# Patient Record
Sex: FEMALE
[De-identification: names, ages, dates, MRNs, and addresses within clinical notes are randomized per-mention and may not be internally consistent; named-entity substitution may affect disease eponyms.]

---

## 2017-06-23 ENCOUNTER — HOSPITAL (OUTPATIENT)
Dept: OTHER | Age: 47
End: 2017-06-23
Attending: OBSTETRICS & GYNECOLOGY

## 2017-06-27 ENCOUNTER — HOSPITAL (OUTPATIENT)
Dept: OTHER | Age: 47
End: 2017-06-27
Attending: OBSTETRICS & GYNECOLOGY

## 2017-09-21 RX ORDER — LOSARTAN POTASSIUM AND HYDROCHLOROTHIAZIDE 25; 100 MG/1; MG/1
TABLET ORAL
Qty: 30 TABLET | Refills: 0 | Status: SHIPPED | OUTPATIENT
Start: 2017-09-21 | End: 2017-12-22

## 2017-09-25 ENCOUNTER — OFFICE VISIT (OUTPATIENT)
Dept: INTERNAL MEDICINE CLINIC | Facility: CLINIC | Age: 47
End: 2017-09-25

## 2017-09-25 VITALS
BODY MASS INDEX: 44.56 KG/M2 | DIASTOLIC BLOOD PRESSURE: 80 MMHG | OXYGEN SATURATION: 98 % | HEIGHT: 64.5 IN | HEART RATE: 89 BPM | SYSTOLIC BLOOD PRESSURE: 120 MMHG | RESPIRATION RATE: 16 BRPM | WEIGHT: 264.25 LBS | TEMPERATURE: 98 F

## 2017-09-25 DIAGNOSIS — E05.90 SUBCLINICAL HYPERTHYROIDISM: ICD-10-CM

## 2017-09-25 DIAGNOSIS — R00.2 PALPITATION: ICD-10-CM

## 2017-09-25 DIAGNOSIS — F41.9 ANXIETY: ICD-10-CM

## 2017-09-25 DIAGNOSIS — I10 ESSENTIAL HYPERTENSION: ICD-10-CM

## 2017-09-25 DIAGNOSIS — E66.01 MORBID OBESITY WITH BMI OF 40.0-44.9, ADULT (HCC): ICD-10-CM

## 2017-09-25 DIAGNOSIS — Z00.00 ANNUAL PHYSICAL EXAM: Primary | ICD-10-CM

## 2017-09-25 DIAGNOSIS — R06.00 DOE (DYSPNEA ON EXERTION): ICD-10-CM

## 2017-09-25 DIAGNOSIS — Z12.4 SCREENING FOR MALIGNANT NEOPLASM OF CERVIX: ICD-10-CM

## 2017-09-25 PROCEDURE — 88175 CYTOPATH C/V AUTO FLUID REDO: CPT | Performed by: PHYSICIAN ASSISTANT

## 2017-09-25 PROCEDURE — 90686 IIV4 VACC NO PRSV 0.5 ML IM: CPT | Performed by: PHYSICIAN ASSISTANT

## 2017-09-25 PROCEDURE — 99396 PREV VISIT EST AGE 40-64: CPT | Performed by: PHYSICIAN ASSISTANT

## 2017-09-25 PROCEDURE — 90471 IMMUNIZATION ADMIN: CPT | Performed by: PHYSICIAN ASSISTANT

## 2017-09-25 NOTE — PATIENT INSTRUCTIONS
Please do your lab work ASAP. Remember to fast for 10-12 hours but drink plenty of water.     Please call Hernan Gilliam at [phone] to set up the following tests:  - treadmill stress test    Follow up with a therapist for anxiety (

## 2017-09-25 NOTE — PROGRESS NOTES
Cathi Henriquez is a 40year old female who presents for a complete physical exam.   HPI:   Pt presents for annual wellness screening. C/o increased stress for the last several months. Attributes this to work, marriage, finances.   Feels anxiety is pea Erik Alvarez, [address]  2012: OTHER SURGICAL HISTORY      Comment: UTERINE ABLATION  2009: TUBAL LIGATION   Family History   Problem Relation Age of Onset   • Hypertension Mother    • Lipids Mother    • Cancer Neg    • Heart Disease Neg    • Stroke Neg       S CMT, adnexa nontender  BREAST: deferred to GYN  MUSCULOSKELETAL: no spinal tenderness, no CVA tenderness  EXTREMITIES: no cyanosis, clubbing, or edema  NEURO: A&O x3, cranial nerves are intact, gross sensation and motor function intact    ASSESSMENT AND PL

## 2017-10-06 RX ORDER — METRONIDAZOLE 7.5 MG/G
GEL VAGINAL
Qty: 1 TUBE | Refills: 0 | Status: SHIPPED | OUTPATIENT
Start: 2017-10-06 | End: 2018-02-13 | Stop reason: ALTCHOICE

## 2017-10-19 ENCOUNTER — TELEPHONE (OUTPATIENT)
Dept: INTERNAL MEDICINE CLINIC | Facility: CLINIC | Age: 47
End: 2017-10-19

## 2017-10-19 DIAGNOSIS — E66.01 MORBID OBESITY WITH BMI OF 40.0-44.9, ADULT (HCC): ICD-10-CM

## 2017-10-19 DIAGNOSIS — R00.2 PALPITATION: Primary | ICD-10-CM

## 2017-10-19 DIAGNOSIS — I10 ESSENTIAL HYPERTENSION: ICD-10-CM

## 2017-10-19 DIAGNOSIS — R06.00 DOE (DYSPNEA ON EXERTION): ICD-10-CM

## 2017-10-19 NOTE — TELEPHONE ENCOUNTER
Wrong stress test ordered, please send new order, patient scheduled for tomorrow morning?  Jesica scan or dobutamine, please advise

## 2017-12-15 NOTE — ED AVS SNAPSHOT
DISCHARGE PLANNING     Discharge to home or other facility with appropriate resources Progressing        DISCHARGE PLANNING - CARE MANAGEMENT     Discharge to post-acute care or home with appropriate resources Progressing        INFECTION - ADULT     Absence or prevention of progression during hospitalization Progressing     Absence of fever/infection during neutropenic period Progressing        PAIN - ADULT     Verbalizes/displays adequate comfort level or baseline comfort level Progressing        Potential for Falls     Patient will remain free of falls Progressing        SAFETY ADULT     Patient will remain free of falls Progressing     Maintain or return to baseline ADL function Progressing     Maintain or return mobility status to optimal level Progressing Sreedhar Gee   MRN: [id number]    Department:  [facility] Emergency Department   Date of Visit:  9/5/2018           Disclosure     Insurance plans vary and the physician(s) referred by the ER may not be covered by your plan.  Please contact you tell this physician (or your personal doctor if your instructions are to return to your personal doctor) about any new or lasting problems. The primary care or specialist physician will see patients referred from the [facility] Emergency Department.  Waldo Cifuentes

## 2017-12-22 RX ORDER — LOSARTAN POTASSIUM AND HYDROCHLOROTHIAZIDE 25; 100 MG/1; MG/1
TABLET ORAL
Qty: 90 TABLET | Refills: 1 | Status: SHIPPED | OUTPATIENT
Start: 2017-12-22 | End: 2018-08-20

## 2018-01-17 ENCOUNTER — APPOINTMENT (OUTPATIENT)
Dept: GENERAL RADIOLOGY | Age: 48
End: 2018-01-17
Attending: PHYSICIAN ASSISTANT
Payer: COMMERCIAL

## 2018-01-17 ENCOUNTER — HOSPITAL ENCOUNTER (OUTPATIENT)
Age: 48
Discharge: HOME | End: 2018-01-17
Payer: COMMERCIAL

## 2018-01-17 VITALS
RESPIRATION RATE: 16 BRPM | TEMPERATURE: 98 F | DIASTOLIC BLOOD PRESSURE: 92 MMHG | SYSTOLIC BLOOD PRESSURE: 144 MMHG | OXYGEN SATURATION: 98 % | HEART RATE: 83 BPM

## 2018-01-17 DIAGNOSIS — S46.911A STRAIN OF RIGHT ELBOW, INITIAL ENCOUNTER: ICD-10-CM

## 2018-01-17 DIAGNOSIS — S46.911A STRAIN OF RIGHT SHOULDER, INITIAL ENCOUNTER: Primary | ICD-10-CM

## 2018-01-17 PROCEDURE — 73030 X-RAY EXAM OF SHOULDER: CPT | Performed by: PHYSICIAN ASSISTANT

## 2018-01-17 PROCEDURE — 99214 OFFICE O/P EST MOD 30 MIN: CPT

## 2018-01-17 PROCEDURE — 73080 X-RAY EXAM OF ELBOW: CPT | Performed by: PHYSICIAN ASSISTANT

## 2018-01-17 PROCEDURE — 99213 OFFICE O/P EST LOW 20 MIN: CPT

## 2018-01-17 RX ORDER — IBUPROFEN 600 MG/1
600 TABLET ORAL EVERY 6 HOURS
Qty: 20 TABLET | Refills: 0 | Status: SHIPPED | OUTPATIENT
Start: 2018-01-17 | End: 2018-01-22

## 2018-01-17 RX ORDER — IBUPROFEN 600 MG/1
600 TABLET ORAL ONCE
Status: COMPLETED | OUTPATIENT
Start: 2018-01-17 | End: 2018-01-17

## 2018-01-17 NOTE — ED PROVIDER NOTES
Patient Seen in: [address] [facility] In [facility]    History   Patient presents with:  Shoulder Injury    Stated Complaint: right shoulder pain 2 days    HPI    Venice Late is a 78-year-old female who presents today for evaluation of right upper extremit air)    Current:/92   Pulse 83   Temp 98 °F (36.7 °C) (Oral)   Resp 16   LMP 01/16/2018   SpO2 98%         Physical Exam   Constitutional: She is oriented to person, place, and time. She appears well-developed and well-nourished. No distress.    HENT: humeral diaphysis. Minimal spurring involves the lateral compartment of the right elbow with a tiny lateral epicondylar spur. SOFT TISSUES:  Negative. No visible soft tissue swelling. EFFUSION:  None visible. OTHER:  Negative.       CONCLUSION:  No acute patient is in good condition throughout the visit today and remains so upon discharge. I discuss the plan of care with the patient, who expresses understanding.   All questions and concerns are addressed to the patient's satisfaction prior to discharge tod

## 2018-02-13 ENCOUNTER — OFFICE VISIT (OUTPATIENT)
Dept: INTERNAL MEDICINE CLINIC | Facility: CLINIC | Age: 48
End: 2018-02-13

## 2018-02-13 ENCOUNTER — APPOINTMENT (OUTPATIENT)
Dept: LAB | Age: 48
End: 2018-02-13
Attending: NURSE PRACTITIONER
Payer: COMMERCIAL

## 2018-02-13 VITALS
RESPIRATION RATE: 16 BRPM | TEMPERATURE: 99 F | HEART RATE: 86 BPM | SYSTOLIC BLOOD PRESSURE: 124 MMHG | DIASTOLIC BLOOD PRESSURE: 80 MMHG | WEIGHT: 262.25 LBS | BODY MASS INDEX: 44 KG/M2 | OXYGEN SATURATION: 99 %

## 2018-02-13 DIAGNOSIS — I10 ESSENTIAL HYPERTENSION: ICD-10-CM

## 2018-02-13 DIAGNOSIS — R25.2 LEG CRAMPING: Primary | ICD-10-CM

## 2018-02-13 DIAGNOSIS — R06.00 DYSPNEA ON EXERTION: ICD-10-CM

## 2018-02-13 DIAGNOSIS — M25.511 ACUTE PAIN OF RIGHT SHOULDER: ICD-10-CM

## 2018-02-13 DIAGNOSIS — F41.9 ANXIETY: ICD-10-CM

## 2018-02-13 LAB
BUN BLD-MCNC: 9 MG/DL (ref 8–20)
CALCIUM BLD-MCNC: 9.4 MG/DL (ref 8.3–10.3)
CHLORIDE: 107 MMOL/L (ref 101–111)
CO2: 29 MMOL/L (ref 22–32)
CREAT BLD-MCNC: 0.85 MG/DL (ref 0.55–1.02)
GLUCOSE BLD-MCNC: 78 MG/DL (ref 70–99)
HAV IGM SER QL: 1.9 MG/DL (ref 1.7–3)
POTASSIUM SERPL-SCNC: 4 MMOL/L (ref 3.6–5.1)
SODIUM SERPL-SCNC: 141 MMOL/L (ref 136–144)

## 2018-02-13 PROCEDURE — 99213 OFFICE O/P EST LOW 20 MIN: CPT | Performed by: NURSE PRACTITIONER

## 2018-02-13 PROCEDURE — 80048 BASIC METABOLIC PNL TOTAL CA: CPT | Performed by: NURSE PRACTITIONER

## 2018-02-13 PROCEDURE — 83735 ASSAY OF MAGNESIUM: CPT | Performed by: NURSE PRACTITIONER

## 2018-02-13 RX ORDER — POTASSIUM CHLORIDE 750 MG/1
10 TABLET, EXTENDED RELEASE ORAL DAILY
Qty: 30 TABLET | Refills: 5 | Status: SHIPPED | OUTPATIENT
Start: 2018-02-13 | End: 2019-03-05

## 2018-02-13 NOTE — PROGRESS NOTES
CHIEF COMPLAINT:     Patient presents with:  Urgent Care F/u: 1/17/18 dt right shoulder injury. Still c/o right shoulder pain. Leg Pain: Intermittent right lower leg pain since last week. Anxiety: and stress since last month.        HPI:   Kristen Goins She does want to get her stress test done that was ordered as she is also still having KWOK and fatigue which she feels may be related to deconditioning and anxiety but wants to be sure her heart is ok.        Current Outpatient Prescriptions:  Potassium Chl to spine or notable deformities, FROM of neck  Exam of right shoulder/elbow   - swelling: none  - tenderness: to scapula and acromion head/humerus   - deformity/defect: none obvious  - crepitus: none  - ecchymosis/bruising: none  - tendon / deep structures

## 2018-02-27 ENCOUNTER — TELEPHONE (OUTPATIENT)
Dept: INTERNAL MEDICINE CLINIC | Facility: CLINIC | Age: 48
End: 2018-02-27

## 2018-02-27 ENCOUNTER — HOSPITAL ENCOUNTER (OUTPATIENT)
Dept: CV DIAGNOSTICS | Facility: HOSPITAL | Age: 48
Discharge: HOME | End: 2018-02-27
Attending: PHYSICIAN ASSISTANT
Payer: COMMERCIAL

## 2018-02-27 DIAGNOSIS — I10 ESSENTIAL HYPERTENSION: ICD-10-CM

## 2018-02-27 DIAGNOSIS — R00.2 PALPITATION: ICD-10-CM

## 2018-02-27 DIAGNOSIS — E66.01 MORBID OBESITY WITH BMI OF 40.0-44.9, ADULT (HCC): ICD-10-CM

## 2018-02-27 DIAGNOSIS — R06.00 DOE (DYSPNEA ON EXERTION): ICD-10-CM

## 2018-02-27 PROCEDURE — 93018 CV STRESS TEST I&R ONLY: CPT | Performed by: PHYSICIAN ASSISTANT

## 2018-02-27 PROCEDURE — 93017 CV STRESS TEST TRACING ONLY: CPT | Performed by: PHYSICIAN ASSISTANT

## 2018-02-27 NOTE — TELEPHONE ENCOUNTER
Patient called regarding test results. Stated that it has been a few weeks since she had her labs done and has not received a call for results.  Please call

## 2018-03-05 ENCOUNTER — APPOINTMENT (OUTPATIENT)
Dept: PHYSICAL THERAPY | Age: 48
End: 2018-03-05
Attending: NURSE PRACTITIONER
Payer: COMMERCIAL

## 2018-03-07 ENCOUNTER — OFFICE VISIT (OUTPATIENT)
Dept: PHYSICAL THERAPY | Age: 48
End: 2018-03-07
Attending: NURSE PRACTITIONER
Payer: COMMERCIAL

## 2018-03-07 DIAGNOSIS — M25.511 ACUTE PAIN OF RIGHT SHOULDER: ICD-10-CM

## 2018-03-07 PROCEDURE — 97110 THERAPEUTIC EXERCISES: CPT

## 2018-03-07 PROCEDURE — 97161 PT EVAL LOW COMPLEX 20 MIN: CPT

## 2018-03-08 NOTE — PROGRESS NOTES
INITIAL EVALUATION:   Referring Physician: Dr. Bright Owens  Diagnosis: Acute pain of right shoulder (M25.511)       Date of Service: 3/7/2018     PATIENT Belinda Gallegos is a 56year old y/o female who presents to therapy today with comp musculature  Sensation: Bilateral UE light touch sensation intact    ROM: Seated AROM Right shoulder; Shoulder flexion 40 degrees, Abduction 50 degrees;  Supine AROM shoulder flexion 130 degrees, ER 90 degrees, IR 70 degrees    Accessory motion: No signific participate in planning and for this course of care. Thank you for your referral. Please co-sign or sign and return this letter via fax as soon as possible to [phone].  If you have any questions, please contact me at Dept: [phone]    Sincerely,

## 2018-03-14 ENCOUNTER — OFFICE VISIT (OUTPATIENT)
Dept: PHYSICAL THERAPY | Age: 48
End: 2018-03-14
Attending: NURSE PRACTITIONER
Payer: COMMERCIAL

## 2018-03-14 DIAGNOSIS — M25.511 ACUTE PAIN OF RIGHT SHOULDER: ICD-10-CM

## 2018-03-14 PROCEDURE — 97110 THERAPEUTIC EXERCISES: CPT

## 2018-03-15 NOTE — PROGRESS NOTES
Dx: Acute pain of right shoulder (M25.511)     Authorized # of Visits:  12  Fall Risk: standard         Precautions: n/a             Subjective: The patient continues to have significant limitations with ROM.  She reports she hasn't gotten shoulder pulley

## 2018-03-19 ENCOUNTER — OFFICE VISIT (OUTPATIENT)
Dept: PHYSICAL THERAPY | Age: 48
End: 2018-03-19
Attending: NURSE PRACTITIONER
Payer: COMMERCIAL

## 2018-03-19 DIAGNOSIS — M25.511 ACUTE PAIN OF RIGHT SHOULDER: ICD-10-CM

## 2018-03-19 PROCEDURE — 97110 THERAPEUTIC EXERCISES: CPT

## 2018-03-20 NOTE — PROGRESS NOTES
Dx: Acute pain of right shoulder (M25.511)     Authorized # of Visits:  12  Fall Risk: standard         Precautions: n/a             Subjective:    The patient arrived 20 minutes late  Objective:   See flow sheet  ROM: Shoulder flexion 45 degrees with signi

## 2018-03-21 ENCOUNTER — OFFICE VISIT (OUTPATIENT)
Dept: PHYSICAL THERAPY | Age: 48
End: 2018-03-21
Attending: NURSE PRACTITIONER
Payer: COMMERCIAL

## 2018-03-21 DIAGNOSIS — M25.511 ACUTE PAIN OF RIGHT SHOULDER: ICD-10-CM

## 2018-03-21 PROCEDURE — 97110 THERAPEUTIC EXERCISES: CPT

## 2018-03-22 NOTE — PROGRESS NOTES
Dx: Acute pain of right shoulder (M25.511)     Authorized # of Visits:  12  Fall Risk: standard         Precautions: n/a             Subjective:    The patient arrived 20 minutes late again  Objective:   See flow sheet  ROM: Shoulder flexion 45 degrees with

## 2018-03-26 ENCOUNTER — APPOINTMENT (OUTPATIENT)
Dept: PHYSICAL THERAPY | Age: 48
End: 2018-03-26
Attending: NURSE PRACTITIONER
Payer: COMMERCIAL

## 2018-03-28 ENCOUNTER — APPOINTMENT (OUTPATIENT)
Dept: PHYSICAL THERAPY | Age: 48
End: 2018-03-28
Attending: NURSE PRACTITIONER
Payer: COMMERCIAL

## 2018-04-02 ENCOUNTER — APPOINTMENT (OUTPATIENT)
Dept: PHYSICAL THERAPY | Age: 48
End: 2018-04-02
Attending: NURSE PRACTITIONER
Payer: COMMERCIAL

## 2018-04-04 ENCOUNTER — APPOINTMENT (OUTPATIENT)
Dept: PHYSICAL THERAPY | Age: 48
End: 2018-04-04
Attending: NURSE PRACTITIONER
Payer: COMMERCIAL

## 2018-04-11 ENCOUNTER — APPOINTMENT (OUTPATIENT)
Dept: PHYSICAL THERAPY | Age: 48
End: 2018-04-11
Attending: NURSE PRACTITIONER
Payer: COMMERCIAL

## 2018-08-03 ENCOUNTER — OFFICE VISIT (OUTPATIENT)
Dept: INTERNAL MEDICINE CLINIC | Facility: CLINIC | Age: 48
End: 2018-08-03
Payer: COMMERCIAL

## 2018-08-03 ENCOUNTER — APPOINTMENT (OUTPATIENT)
Dept: LAB | Age: 48
End: 2018-08-03
Attending: PHYSICIAN ASSISTANT
Payer: COMMERCIAL

## 2018-08-03 VITALS
BODY MASS INDEX: 40.41 KG/M2 | OXYGEN SATURATION: 97 % | SYSTOLIC BLOOD PRESSURE: 124 MMHG | HEIGHT: 65.5 IN | TEMPERATURE: 99 F | WEIGHT: 245.5 LBS | DIASTOLIC BLOOD PRESSURE: 76 MMHG | HEART RATE: 97 BPM

## 2018-08-03 DIAGNOSIS — Z00.00 ANNUAL PHYSICAL EXAM: Primary | ICD-10-CM

## 2018-08-03 DIAGNOSIS — Z12.39 SCREENING FOR MALIGNANT NEOPLASM OF BREAST: ICD-10-CM

## 2018-08-03 LAB
ALT SERPL-CCNC: 22 U/L (ref 14–54)
ANION GAP SERPL CALC-SCNC: 6 MMOL/L (ref 0–18)
AST SERPL-CCNC: 15 U/L (ref 15–41)
BUN BLD-MCNC: 10 MG/DL (ref 8–20)
BUN/CREAT SERPL: 12.5 (ref 10–20)
CALCIUM BLD-MCNC: 8.7 MG/DL (ref 8.3–10.3)
CHLORIDE SERPL-SCNC: 106 MMOL/L (ref 101–111)
CHOLEST SMN-MCNC: 185 MG/DL (ref ?–200)
CO2 SERPL-SCNC: 29 MMOL/L (ref 22–32)
CREAT BLD-MCNC: 0.8 MG/DL (ref 0.55–1.02)
EST. AVERAGE GLUCOSE BLD GHB EST-MCNC: 117 MG/DL (ref 68–126)
GLUCOSE BLD-MCNC: 73 MG/DL (ref 70–99)
HBA1C MFR BLD HPLC: 5.7 % (ref ?–5.7)
HDLC SERPL-MCNC: 67 MG/DL (ref 40–59)
LDLC SERPL CALC-MCNC: 106 MG/DL (ref ?–100)
NONHDLC SERPL-MCNC: 118 MG/DL (ref ?–130)
OSMOLALITY SERPL CALC.SUM OF ELEC: 290 MOSM/KG (ref 275–295)
POTASSIUM SERPL-SCNC: 3.8 MMOL/L (ref 3.6–5.1)
SODIUM SERPL-SCNC: 141 MMOL/L (ref 136–144)
T4 FREE SERPL-MCNC: 1 NG/DL (ref 0.9–1.8)
TRIGL SERPL-MCNC: 58 MG/DL (ref 30–149)
TSI SER-ACNC: 0.12 MIU/ML (ref 0.35–5.5)
VLDLC SERPL CALC-MCNC: 12 MG/DL (ref 0–30)

## 2018-08-03 PROCEDURE — 84443 ASSAY THYROID STIM HORMONE: CPT | Performed by: PHYSICIAN ASSISTANT

## 2018-08-03 PROCEDURE — 84439 ASSAY OF FREE THYROXINE: CPT | Performed by: PHYSICIAN ASSISTANT

## 2018-08-03 PROCEDURE — 36415 COLL VENOUS BLD VENIPUNCTURE: CPT | Performed by: PHYSICIAN ASSISTANT

## 2018-08-03 PROCEDURE — 83036 HEMOGLOBIN GLYCOSYLATED A1C: CPT | Performed by: PHYSICIAN ASSISTANT

## 2018-08-03 PROCEDURE — 99386 PREV VISIT NEW AGE 40-64: CPT | Performed by: PHYSICIAN ASSISTANT

## 2018-08-03 PROCEDURE — 84460 ALANINE AMINO (ALT) (SGPT): CPT | Performed by: PHYSICIAN ASSISTANT

## 2018-08-03 PROCEDURE — 80061 LIPID PANEL: CPT | Performed by: PHYSICIAN ASSISTANT

## 2018-08-03 PROCEDURE — 80048 BASIC METABOLIC PNL TOTAL CA: CPT | Performed by: PHYSICIAN ASSISTANT

## 2018-08-03 PROCEDURE — 84450 TRANSFERASE (AST) (SGOT): CPT | Performed by: PHYSICIAN ASSISTANT

## 2018-08-03 NOTE — PROGRESS NOTES
Deyanira Gardner is a 56year old female who presents for a complete physical exam.   HPI:   Pt presents for annual wellness screening. Diet - well balanced, trying to focus on a low carb diet. Drinks 1 cup of coffee and water.   Exercise - none current weakness  BREAST: no pain, discharge, or lumps  HEMATOLOGIC: denies easy bruising or bleeding  LYMPH: denies swollen lymph nodes  ENDOCRINE: denies hot/cold intolerance    EXAM:   /76 (BP Location: Left arm, Patient Position: Sitting, Cuff Size: larg Discussed MMG screening recs, pt prefers to screen yearly. -- she does these at [facility].  # Cervical CA screening: normal pap 9/2017 - repeat in 2020. # Colon CA screening: c-scope at age 48. # Bone density: rec ca, vit d, regular WB exercise.

## 2018-08-03 NOTE — PATIENT INSTRUCTIONS
Blood work today. Schedule your mammogram.    See Dr. Danisha Alvares. Flu shot in October. If labs normal, please see Bere Martinez in 6-12 months.

## 2018-08-20 RX ORDER — LOSARTAN POTASSIUM AND HYDROCHLOROTHIAZIDE 25; 100 MG/1; MG/1
1 TABLET ORAL DAILY
Qty: 90 TABLET | Refills: 0 | Status: SHIPPED | OUTPATIENT
Start: 2018-08-20 | End: 2018-12-17

## 2018-08-20 NOTE — TELEPHONE ENCOUNTER
Refill requested: Losartan HCTZ 100-25 mg     Passed protocol      Last refill: 12/22/17 #90 1R    Relevant Labs: BMP 8/3/18 LL   BP Readings from Last 3 Encounters:  08/03/18 : 124/76  02/13/18 : 124/80  01/17/18 : 144/92      Last OV / RTC advised: 8/3/1

## 2018-09-06 NOTE — ED PROVIDER NOTES
Patient Seen in: [facility] Emergency Department    History   Patient presents with: Anxiety/Panic attack (neurologic)    Stated Complaint: anxiety/panic attack denies SI    HPI    Patient is a 52-year-old woman here with anxiety.   For the past cou person, place, and time. She appears well-developed and well-nourished. Non-toxic appearance. No distress. HENT:   Head: Normocephalic and atraumatic. Eyes: Conjunctivae are normal. No scleral icterus. Neck: Normal range of motion. Neck supple.    Ca

## 2018-09-06 NOTE — BH LEVEL OF CARE ASSESSMENT
Level of Care Assessment Note    General Questions  Why are you here?: Pt is a 56 yr old female who arrived to the ER alone due to recent anxiety. \"I've been having a lot of anxiety. Unable to concentrate, lack of sleep, not happy. \" Pt states this has be hallucinations drug or alcohol use or abuse. She has not had a good night sleep in a very long time and came as thoughts of her problems keep her awake and she has trouble sleeping. \"    Family Collateral  Family Collateral: none available  Reason Patient or observed  Depression Symptoms: Appetite change;Sleep disturbance; Increased irritability;Crying; Change in energy level  Depression Description:  had an affair and she's having a hard time dealing with it, states has been  half her life and Yes                          Current/Previous MH/CD Treatment  Recovery Support Groups: Denies Past History  History of Seclusion/Restraint: No    Alcohol Use  How often do you have a drink containing alcohol? : 1 time per month or less  Alcohol Use  Age a Not appropriate for reporting to authorities    General Appearance  Characteristics: Appropriate clothing (pt wearing a hospital gown)  Eye Contact: Direct  Psychomotor Behavior  Gait/Movement: Other (comment) (yris; pt laying on hosptial bed)  Abnormal mov counseling \"years ago. \" Pt has no current providers. Pt's friend gave her a referral for a counselor and pt states she plans on calling her PCP for medication to help with her anxiety.     Risk/Protective Factors  Risk Factors: Stressful life events or lo

## 2018-09-06 NOTE — ED INITIAL ASSESSMENT (HPI)
Here for anxiety for months, worsening sx with insomnia denies any SI.  Punched wall tonight, complained of bilateral wrist pain

## 2018-10-04 PROBLEM — E04.1 THYROID NODULE: Status: ACTIVE | Noted: 2018-10-04

## 2018-11-19 ENCOUNTER — TELEPHONE (OUTPATIENT)
Dept: INTERNAL MEDICINE CLINIC | Facility: CLINIC | Age: 48
End: 2018-11-19

## 2018-11-19 NOTE — TELEPHONE ENCOUNTER
Menses started 10/29/18 ,pretty much steady flow Will stop a day at a time then resume . Traveling to [facility] tomorrow /Feeling very tired

## 2018-12-04 ENCOUNTER — IMMUNIZATION (OUTPATIENT)
Dept: INTERNAL MEDICINE CLINIC | Facility: CLINIC | Age: 48
End: 2018-12-04
Payer: COMMERCIAL

## 2018-12-04 DIAGNOSIS — Z23 NEED FOR VACCINATION: ICD-10-CM

## 2018-12-04 PROCEDURE — 90471 IMMUNIZATION ADMIN: CPT | Performed by: INTERNAL MEDICINE

## 2018-12-04 PROCEDURE — 90686 IIV4 VACC NO PRSV 0.5 ML IM: CPT | Performed by: INTERNAL MEDICINE

## 2018-12-17 RX ORDER — LOSARTAN POTASSIUM AND HYDROCHLOROTHIAZIDE 25; 100 MG/1; MG/1
1 TABLET ORAL DAILY
Qty: 90 TABLET | Refills: 0 | Status: SHIPPED | OUTPATIENT
Start: 2018-12-17 | End: 2019-03-05

## 2018-12-17 NOTE — TELEPHONE ENCOUNTER
Last office visit 8/3/18 with MATILDE Cormier. Last labs 8/3/18. Last refill 8/20/18 (#90, 0 refills).

## 2019-03-05 ENCOUNTER — LAB ENCOUNTER (OUTPATIENT)
Dept: LAB | Age: 49
End: 2019-03-05
Attending: PHYSICIAN ASSISTANT
Payer: COMMERCIAL

## 2019-03-05 ENCOUNTER — OFFICE VISIT (OUTPATIENT)
Dept: INTERNAL MEDICINE CLINIC | Facility: CLINIC | Age: 49
End: 2019-03-05
Payer: COMMERCIAL

## 2019-03-05 VITALS
TEMPERATURE: 98 F | OXYGEN SATURATION: 98 % | HEART RATE: 97 BPM | RESPIRATION RATE: 16 BRPM | SYSTOLIC BLOOD PRESSURE: 130 MMHG | HEIGHT: 65 IN | BODY MASS INDEX: 41.65 KG/M2 | WEIGHT: 250 LBS | DIASTOLIC BLOOD PRESSURE: 76 MMHG

## 2019-03-05 DIAGNOSIS — R73.03 PREDIABETES: ICD-10-CM

## 2019-03-05 DIAGNOSIS — E66.01 MORBID OBESITY WITH BMI OF 40.0-44.9, ADULT (HCC): ICD-10-CM

## 2019-03-05 DIAGNOSIS — R00.2 PALPITATIONS: ICD-10-CM

## 2019-03-05 DIAGNOSIS — E04.1 THYROID NODULE: ICD-10-CM

## 2019-03-05 DIAGNOSIS — Z02.89 ENCOUNTER FOR PHYSICAL EXAMINATION RELATED TO EMPLOYMENT: ICD-10-CM

## 2019-03-05 DIAGNOSIS — I10 ESSENTIAL HYPERTENSION: ICD-10-CM

## 2019-03-05 DIAGNOSIS — Z02.89 ENCOUNTER FOR PHYSICAL EXAMINATION RELATED TO EMPLOYMENT: Primary | ICD-10-CM

## 2019-03-05 DIAGNOSIS — R00.2 PALPITATION: ICD-10-CM

## 2019-03-05 DIAGNOSIS — E05.90 SUBCLINICAL HYPERTHYROIDISM: ICD-10-CM

## 2019-03-05 DIAGNOSIS — F41.9 ANXIETY: ICD-10-CM

## 2019-03-05 DIAGNOSIS — R25.2 LEG CRAMPING: ICD-10-CM

## 2019-03-05 LAB
ANION GAP SERPL CALC-SCNC: 8 MMOL/L (ref 0–18)
BUN BLD-MCNC: 11 MG/DL (ref 7–18)
BUN/CREAT SERPL: 12.4 (ref 10–20)
CALCIUM BLD-MCNC: 9.5 MG/DL (ref 8.5–10.1)
CHLORIDE SERPL-SCNC: 105 MMOL/L (ref 98–107)
CO2 SERPL-SCNC: 27 MMOL/L (ref 21–32)
CREAT BLD-MCNC: 0.89 MG/DL (ref 0.55–1.02)
EST. AVERAGE GLUCOSE BLD GHB EST-MCNC: 111 MG/DL (ref 68–126)
GLUCOSE BLD-MCNC: 83 MG/DL (ref 70–99)
HBA1C MFR BLD HPLC: 5.5 % (ref ?–5.7)
OSMOLALITY SERPL CALC.SUM OF ELEC: 289 MOSM/KG (ref 275–295)
POTASSIUM SERPL-SCNC: 3.9 MMOL/L (ref 3.5–5.1)
SODIUM SERPL-SCNC: 140 MMOL/L (ref 136–145)

## 2019-03-05 PROCEDURE — 83036 HEMOGLOBIN GLYCOSYLATED A1C: CPT | Performed by: PHYSICIAN ASSISTANT

## 2019-03-05 PROCEDURE — 36415 COLL VENOUS BLD VENIPUNCTURE: CPT | Performed by: PHYSICIAN ASSISTANT

## 2019-03-05 PROCEDURE — 99214 OFFICE O/P EST MOD 30 MIN: CPT | Performed by: PHYSICIAN ASSISTANT

## 2019-03-05 PROCEDURE — 80048 BASIC METABOLIC PNL TOTAL CA: CPT | Performed by: PHYSICIAN ASSISTANT

## 2019-03-05 RX ORDER — METOPROLOL SUCCINATE 25 MG/1
TABLET, EXTENDED RELEASE ORAL
Qty: 90 TABLET | Refills: 1 | Status: SHIPPED | OUTPATIENT
Start: 2019-03-05 | End: 2019-10-30

## 2019-03-05 RX ORDER — POTASSIUM CHLORIDE 750 MG/1
10 TABLET, EXTENDED RELEASE ORAL DAILY
Qty: 90 TABLET | Refills: 1 | Status: SHIPPED | OUTPATIENT
Start: 2019-03-05 | End: 2019-10-30

## 2019-03-05 RX ORDER — LOSARTAN POTASSIUM AND HYDROCHLOROTHIAZIDE 25; 100 MG/1; MG/1
1 TABLET ORAL DAILY
Qty: 90 TABLET | Refills: 1 | Status: SHIPPED | OUTPATIENT
Start: 2019-03-05 | End: 2019-10-21

## 2019-03-05 NOTE — PATIENT INSTRUCTIONS
Take BP medication AND potassium supplement every day (try taking potassium supplement with applesauce or jello). Resume metoprolol 25 mg daily for palpitations. Resume seeing therapist for anxiety.     Follow up with Rick Grover in 1 month if anxiety not

## 2019-03-05 NOTE — PROGRESS NOTES
Deyanira Gardner is a 53year old female. HPI:   Patient presents for f/u of chronic health issues and work form. Will be working as a . HTN - compliant with medication, no SEs.   Takes K supp only 1-2 times a week bc she has a fred headaches, dizziness, LH  EXT: denies edema    EXAM:   /76 (BP Location: Left arm, Patient Position: Sitting, Cuff Size: large)   Pulse 97   Temp 98.3 °F (36.8 °C) (Oral)   Resp 16   Ht 65\"   Wt 250 lb   LMP 11/01/2018 (Approximate)   SpO2 98%   Chelsea deficiency: now on OTC vit d supp. # Abnormal hgb: pt has known sickle cell trait.     Health Maint:  # Breast CA screening: normal MMG 4/2017 - normal per patient.  Discussed MMG screening recs, pt prefers to screen yearly.  -- she does these at [facility]

## 2019-03-09 ENCOUNTER — HOSPITAL ENCOUNTER (OUTPATIENT)
Dept: MAMMOGRAPHY | Age: 49
Discharge: HOME | End: 2019-03-09
Attending: PHYSICIAN ASSISTANT
Payer: COMMERCIAL

## 2019-03-09 DIAGNOSIS — Z00.00 ANNUAL PHYSICAL EXAM: ICD-10-CM

## 2019-03-09 DIAGNOSIS — Z12.39 SCREENING FOR MALIGNANT NEOPLASM OF BREAST: ICD-10-CM

## 2019-03-09 PROCEDURE — 77067 SCR MAMMO BI INCL CAD: CPT | Performed by: PHYSICIAN ASSISTANT

## 2019-03-09 PROCEDURE — 77063 BREAST TOMOSYNTHESIS BI: CPT | Performed by: PHYSICIAN ASSISTANT

## 2019-10-21 DIAGNOSIS — I10 ESSENTIAL HYPERTENSION: Primary | ICD-10-CM

## 2019-10-22 NOTE — TELEPHONE ENCOUNTER
Patient made appt for 10/30/19, has no medication left and wants to know if she can have a refill until her appt sent to Ted [facility]son

## 2019-10-23 RX ORDER — LOSARTAN POTASSIUM AND HYDROCHLOROTHIAZIDE 25; 100 MG/1; MG/1
1 TABLET ORAL DAILY
Qty: 90 TABLET | Refills: 1 | Status: SHIPPED | OUTPATIENT
Start: 2019-10-23 | End: 2020-03-13

## 2019-10-23 NOTE — TELEPHONE ENCOUNTER
Last OV: 3/5/19 with María Arango PA-C  Last refill date: 3/5/19     #/refills: #90, 1 refill  When pt was asked to return for OV: August 2019  Upcoming appt: 10/30/19 with Dr. Caro Bone   Last labs 3/5/19

## 2019-10-30 ENCOUNTER — OFFICE VISIT (OUTPATIENT)
Dept: INTERNAL MEDICINE CLINIC | Facility: CLINIC | Age: 49
End: 2019-10-30
Payer: COMMERCIAL

## 2019-10-30 VITALS
RESPIRATION RATE: 16 BRPM | TEMPERATURE: 98 F | BODY MASS INDEX: 43.32 KG/M2 | SYSTOLIC BLOOD PRESSURE: 110 MMHG | HEIGHT: 65 IN | HEART RATE: 84 BPM | WEIGHT: 260 LBS | DIASTOLIC BLOOD PRESSURE: 72 MMHG

## 2019-10-30 DIAGNOSIS — Z00.00 ENCOUNTER FOR PREVENTATIVE ADULT HEALTH CARE EXAMINATION: Primary | ICD-10-CM

## 2019-10-30 DIAGNOSIS — E55.9 VITAMIN D DEFICIENCY: ICD-10-CM

## 2019-10-30 DIAGNOSIS — R73.03 PREDIABETES: ICD-10-CM

## 2019-10-30 DIAGNOSIS — E05.90 SUBCLINICAL HYPERTHYROIDISM: ICD-10-CM

## 2019-10-30 DIAGNOSIS — E87.6 HYPOKALEMIA: ICD-10-CM

## 2019-10-30 DIAGNOSIS — R00.2 PALPITATION: ICD-10-CM

## 2019-10-30 DIAGNOSIS — I10 ESSENTIAL HYPERTENSION: ICD-10-CM

## 2019-10-30 PROCEDURE — 99396 PREV VISIT EST AGE 40-64: CPT | Performed by: INTERNAL MEDICINE

## 2019-10-30 RX ORDER — METOPROLOL SUCCINATE 25 MG/1
TABLET, EXTENDED RELEASE ORAL
Qty: 90 TABLET | Refills: 1 | Status: SHIPPED | OUTPATIENT
Start: 2019-10-30 | End: 2020-12-15

## 2019-10-30 RX ORDER — POTASSIUM CHLORIDE 750 MG/1
10 TABLET, EXTENDED RELEASE ORAL DAILY
Qty: 90 TABLET | Refills: 1 | Status: SHIPPED | OUTPATIENT
Start: 2019-10-30 | End: 2020-12-15

## 2019-10-30 NOTE — PATIENT INSTRUCTIONS
- Get blood work done at [address] [place] [address] when you are fasting (at least 8 hours, water and medications only).   - We will call you with the results  - Colonoscopy recommended at age 49  - Try to exercise at least 5-6 hours a week    It was a pleasure seeing you in th

## 2019-10-30 NOTE — PROGRESS NOTES
Yuni Horne is a 53year old female. HPI:   Patient presents with:  Physical    Patient presents for CPX/wellness examination. Diet: Not the best.   Exercise: She used to exercise, but not as much of late. Vision: Wears glasses/contacts.   Up to surgical history (2012); other surgical history (2005); and other surgical history (2012). Family: family history includes Hypertension in her mother; Lipids in her mother. Social:  reports that she has never smoked.  She has never used smokeless tobacco. ER 10 MEQ Oral Tab CR; Take 1 tablet (10 mEq total) by mouth daily. Dispense: 90 tablet; Refill: 1  - COMP METABOLIC PANEL (14); Future    3. Vitamin D deficiency  Not currently on supplementation. Will check updated Vitamin D levels.   - VITAMIN D, 25-HY

## 2020-03-11 DIAGNOSIS — I10 ESSENTIAL HYPERTENSION: ICD-10-CM

## 2020-03-13 RX ORDER — LOSARTAN POTASSIUM AND HYDROCHLOROTHIAZIDE 25; 100 MG/1; MG/1
1 TABLET ORAL DAILY
Qty: 90 TABLET | Refills: 1 | Status: SHIPPED | OUTPATIENT
Start: 2020-03-13 | End: 2020-08-22

## 2020-08-07 ENCOUNTER — APPOINTMENT (OUTPATIENT)
Dept: GENERAL RADIOLOGY | Age: 50
End: 2020-08-07
Payer: COMMERCIAL

## 2020-08-07 ENCOUNTER — HOSPITAL ENCOUNTER (OUTPATIENT)
Age: 50
Discharge: HOME | End: 2020-08-07
Payer: COMMERCIAL

## 2020-08-07 VITALS
DIASTOLIC BLOOD PRESSURE: 81 MMHG | HEART RATE: 92 BPM | RESPIRATION RATE: 16 BRPM | TEMPERATURE: 98 F | OXYGEN SATURATION: 98 % | SYSTOLIC BLOOD PRESSURE: 129 MMHG

## 2020-08-07 DIAGNOSIS — S93.402A MILD SPRAIN OF LEFT ANKLE, INITIAL ENCOUNTER: Primary | ICD-10-CM

## 2020-08-07 PROCEDURE — 99213 OFFICE O/P EST LOW 20 MIN: CPT | Performed by: NURSE PRACTITIONER

## 2020-08-07 PROCEDURE — 73610 X-RAY EXAM OF ANKLE: CPT

## 2020-08-07 PROCEDURE — 73630 X-RAY EXAM OF FOOT: CPT

## 2020-08-07 NOTE — ED PROVIDER NOTES
Patient [facility] in: [facility] In [facility]      History   Patient presents with:  Lower Extremity Injury    Stated Complaint: LT lower injury    HPI  52 yo female presents with left lateral ankle and foot pain after she got up last night and h General: She is not in acute distress. Appearance: She is well-developed. She is not ill-appearing or toxic-appearing. HENT:      Head: Normocephalic and atraumatic. Cardiovascular:      Rate and Rhythm: Normal rate and regular rhythm.       Heart s PATIENT STATED HISTORY: (As transcribed by Technologist)  Patient complains of pain across the dorsal aspect of her left metatarsals and lateral aspect of her left foot after an injury yesterday. FINDINGS:  No fracture or dislocation.   Hallux valgus wit

## 2020-08-07 NOTE — ED INITIAL ASSESSMENT (HPI)
Yesterday pt was sitting for a long time, when she got up L foot felt a little numb so she twisted L ankle upon walking ; pt c/o pain to L foot / ankle

## 2020-08-21 DIAGNOSIS — I10 ESSENTIAL HYPERTENSION: ICD-10-CM

## 2020-08-22 NOTE — TELEPHONE ENCOUNTER
Losartan hctz 100-25 mg 1 tab daily filled 3-13-20 90 with 1 refill     LOV 10-30-19   return to clinic in 6-12 months   No upcoming apt on file   Labs 3-5-19

## 2020-08-24 RX ORDER — LOSARTAN POTASSIUM AND HYDROCHLOROTHIAZIDE 25; 100 MG/1; MG/1
TABLET ORAL
Qty: 90 TABLET | Refills: 0 | Status: SHIPPED | OUTPATIENT
Start: 2020-08-24 | End: 2020-12-15

## 2020-09-03 ENCOUNTER — OFFICE VISIT (OUTPATIENT)
Dept: ORTHOPEDICS CLINIC | Facility: CLINIC | Age: 50
End: 2020-09-03
Payer: COMMERCIAL

## 2020-09-03 VITALS — HEIGHT: 65 IN | BODY MASS INDEX: 43 KG/M2 | RESPIRATION RATE: 16 BRPM | HEART RATE: 91 BPM | OXYGEN SATURATION: 98 %

## 2020-09-03 DIAGNOSIS — S93.492A SPRAIN OF ANTERIOR TALOFIBULAR LIGAMENT OF LEFT ANKLE, INITIAL ENCOUNTER: Primary | ICD-10-CM

## 2020-09-03 DIAGNOSIS — M25.572 LEFT ANKLE PAIN, UNSPECIFIED CHRONICITY: ICD-10-CM

## 2020-09-03 PROCEDURE — 99203 OFFICE O/P NEW LOW 30 MIN: CPT | Performed by: NURSE PRACTITIONER

## 2020-09-03 RX ORDER — DICLOFENAC SODIUM 75 MG/1
75 TABLET, DELAYED RELEASE ORAL 2 TIMES DAILY
Qty: 60 TABLET | Refills: 1 | Status: SHIPPED | OUTPATIENT
Start: 2020-09-03

## 2020-09-03 NOTE — PROGRESS NOTES
[facility] New Patient Note    CC: Patient presents with: Ankle Pain: Left ankle pain 3 weeks     HPI: This 51year old female presents today with complaints of left ankle pain for the past 3 weeks.   She states she got up out of a chair and inverted EXAM  On physical exam age-appropriate 52-year-old female looks stated age no acute distress is alert and oriented x3.   On evaluation of her left ankle shows she does have swelling over the lateral side as well as pain to palpation over the anterior talofi

## 2020-09-11 ENCOUNTER — TELEPHONE (OUTPATIENT)
Dept: PHYSICAL THERAPY | Age: 50
End: 2020-09-11

## 2020-09-11 ENCOUNTER — APPOINTMENT (OUTPATIENT)
Dept: PHYSICAL THERAPY | Age: 50
End: 2020-09-11
Attending: NURSE PRACTITIONER
Payer: COMMERCIAL

## 2020-09-16 ENCOUNTER — TELEPHONE (OUTPATIENT)
Dept: INTERNAL MEDICINE CLINIC | Facility: CLINIC | Age: 50
End: 2020-09-16

## 2020-09-16 DIAGNOSIS — Z12.31 ENCOUNTER FOR SCREENING MAMMOGRAM FOR MALIGNANT NEOPLASM OF BREAST: Primary | ICD-10-CM

## 2020-09-16 NOTE — TELEPHONE ENCOUNTER
Order pending if appropriate. TY!    CONCLUSION:     DIAGNOSTIC CATEGORY 1--NEGATIVE NO CHANGE FROM COMPARISON ASSESSMENT. RECOMMENDATIONS:    ROUTINE MAMMOGRAM AND CLINICAL EVALUATION IN 12 MONTHS.        A letter explaining the results in lay terms

## 2020-09-16 NOTE — TELEPHONE ENCOUNTER
Pt is trying to make an appointment for her mammogram, central scheduling called to request one, pt stated she has dense breast and needs the 3D one. Please call pt as soonest order is ready.        [facility] 2D+3D SCREENING BILAT (CPT=[id number])

## 2020-09-17 ENCOUNTER — OFFICE VISIT (OUTPATIENT)
Dept: PHYSICAL THERAPY | Age: 50
End: 2020-09-17
Attending: NURSE PRACTITIONER
Payer: COMMERCIAL

## 2020-09-17 DIAGNOSIS — M25.572 LEFT ANKLE PAIN, UNSPECIFIED CHRONICITY: ICD-10-CM

## 2020-09-17 DIAGNOSIS — S93.492A SPRAIN OF ANTERIOR TALOFIBULAR LIGAMENT OF LEFT ANKLE, INITIAL ENCOUNTER: ICD-10-CM

## 2020-09-17 PROCEDURE — 97161 PT EVAL LOW COMPLEX 20 MIN: CPT

## 2020-09-17 PROCEDURE — 97035 APP MDLTY 1+ULTRASOUND EA 15: CPT

## 2020-09-17 PROCEDURE — 97110 THERAPEUTIC EXERCISES: CPT

## 2020-09-21 ENCOUNTER — APPOINTMENT (OUTPATIENT)
Dept: PHYSICAL THERAPY | Age: 50
End: 2020-09-21
Attending: NURSE PRACTITIONER
Payer: COMMERCIAL

## 2020-09-23 ENCOUNTER — APPOINTMENT (OUTPATIENT)
Dept: PHYSICAL THERAPY | Age: 50
End: 2020-09-23
Attending: NURSE PRACTITIONER
Payer: COMMERCIAL

## 2020-09-28 ENCOUNTER — OFFICE VISIT (OUTPATIENT)
Dept: PHYSICAL THERAPY | Age: 50
End: 2020-09-28
Attending: NURSE PRACTITIONER
Payer: COMMERCIAL

## 2020-09-28 PROCEDURE — 97140 MANUAL THERAPY 1/> REGIONS: CPT

## 2020-09-28 PROCEDURE — 97035 APP MDLTY 1+ULTRASOUND EA 15: CPT

## 2020-09-28 PROCEDURE — 97110 THERAPEUTIC EXERCISES: CPT

## 2020-09-28 NOTE — PROGRESS NOTES
Diagnosis: Inversion sprain L ankle    Precautions: Nil of Note  Insurance Type (# Auth): [facility] (8) Total Timed Treatment: 45 min   Total Treatment time: 45 min       Charges: MT 1, EX 1, US    Treatment Number: 2/8    Subjective: Pain L ankle is better but

## 2020-09-30 ENCOUNTER — OFFICE VISIT (OUTPATIENT)
Dept: PHYSICAL THERAPY | Age: 50
End: 2020-09-30
Attending: NURSE PRACTITIONER
Payer: COMMERCIAL

## 2020-09-30 PROCEDURE — 97110 THERAPEUTIC EXERCISES: CPT

## 2020-09-30 PROCEDURE — 97140 MANUAL THERAPY 1/> REGIONS: CPT

## 2020-09-30 PROCEDURE — 97035 APP MDLTY 1+ULTRASOUND EA 15: CPT

## 2020-09-30 NOTE — PROGRESS NOTES
Diagnosis: Inversion sprain L ankle    Precautions: Nil of Note  Insurance Type (# Auth): [facility] (8) Total Timed Treatment: 45 min   Total Treatment time: 45 min       Charges: MT 1, EX 1, US    Treatment Number: 3/8    Subjective: Pain L ankle is better but

## 2020-10-07 ENCOUNTER — OFFICE VISIT (OUTPATIENT)
Dept: PHYSICAL THERAPY | Age: 50
End: 2020-10-07
Attending: NURSE PRACTITIONER
Payer: COMMERCIAL

## 2020-10-07 PROCEDURE — 97035 APP MDLTY 1+ULTRASOUND EA 15: CPT

## 2020-10-07 PROCEDURE — 97110 THERAPEUTIC EXERCISES: CPT

## 2020-10-07 PROCEDURE — 97140 MANUAL THERAPY 1/> REGIONS: CPT

## 2020-10-07 NOTE — PROGRESS NOTES
Diagnosis: Inversion sprain L ankle    Precautions: Nil of Note  Insurance Type (# Auth): [facility] (8) Total Timed Treatment: 45 min   Total Treatment time: 45 min       Charges: MT 1, EX 1, US    Treatment Number: 4/8    Subjective: Pain L ankle is better but complex L ankle, ultrasound lateral aspect L ankle and foot, strengthening L foot and ankle, proprioception training L ankle.

## 2020-10-12 ENCOUNTER — APPOINTMENT (OUTPATIENT)
Dept: PHYSICAL THERAPY | Age: 50
End: 2020-10-12
Attending: NURSE PRACTITIONER
Payer: COMMERCIAL

## 2020-10-12 ENCOUNTER — TELEPHONE (OUTPATIENT)
Dept: PHYSICAL THERAPY | Age: 50
End: 2020-10-12

## 2020-10-14 ENCOUNTER — OFFICE VISIT (OUTPATIENT)
Dept: PHYSICAL THERAPY | Age: 50
End: 2020-10-14
Attending: NURSE PRACTITIONER
Payer: COMMERCIAL

## 2020-10-14 ENCOUNTER — OFFICE VISIT (OUTPATIENT)
Dept: ORTHOPEDICS CLINIC | Facility: CLINIC | Age: 50
End: 2020-10-14
Payer: COMMERCIAL

## 2020-10-14 VITALS — OXYGEN SATURATION: 99 % | HEART RATE: 91 BPM

## 2020-10-14 DIAGNOSIS — S93.492A SPRAIN OF ANTERIOR TALOFIBULAR LIGAMENT OF LEFT ANKLE, INITIAL ENCOUNTER: Primary | ICD-10-CM

## 2020-10-14 PROCEDURE — 97112 NEUROMUSCULAR REEDUCATION: CPT

## 2020-10-14 PROCEDURE — 99212 OFFICE O/P EST SF 10 MIN: CPT | Performed by: NURSE PRACTITIONER

## 2020-10-14 PROCEDURE — 97035 APP MDLTY 1+ULTRASOUND EA 15: CPT

## 2020-10-14 PROCEDURE — 97110 THERAPEUTIC EXERCISES: CPT

## 2020-10-14 NOTE — PROGRESS NOTES
Diagnosis: Inversion sprain L ankle    Precautions: Nil of Note  Insurance Type (# Auth): [facility] (8) Total Timed Treatment: 45 min   Total Treatment time: 45 min       Charges: NMRed 1, EX 1, US    Treatment Number: 5/8    Subjective: Pain L ankle following l swelling and tenderness and plan to discharge to [facility]. Plan: Continue PT for remaining 3 visits, next visit 10/28/2020.  Soft tissue, friction massage lateral ligament complex L ankle, ultrasound lateral aspect L ankle and foot, strengthening L foot and

## 2020-10-14 NOTE — PROGRESS NOTES
[facility] Progress Note    CC: Patient presents with: Follow - Up: left ankle    HPI: This 51year old female presents today up on her left ankle.   She states she has been in physical therapy and maintaining her hard soled shoe and she is doing anaya

## 2020-10-24 ENCOUNTER — HOSPITAL ENCOUNTER (OUTPATIENT)
Dept: MAMMOGRAPHY | Age: 50
Discharge: HOME | End: 2020-10-24
Attending: INTERNAL MEDICINE
Payer: COMMERCIAL

## 2020-10-24 DIAGNOSIS — Z12.31 ENCOUNTER FOR SCREENING MAMMOGRAM FOR MALIGNANT NEOPLASM OF BREAST: ICD-10-CM

## 2020-10-24 PROCEDURE — 77063 BREAST TOMOSYNTHESIS BI: CPT | Performed by: INTERNAL MEDICINE

## 2020-10-24 PROCEDURE — 77067 SCR MAMMO BI INCL CAD: CPT | Performed by: INTERNAL MEDICINE

## 2020-10-28 ENCOUNTER — OFFICE VISIT (OUTPATIENT)
Dept: PHYSICAL THERAPY | Age: 50
End: 2020-10-28
Attending: NURSE PRACTITIONER
Payer: COMMERCIAL

## 2020-10-28 PROCEDURE — 97035 APP MDLTY 1+ULTRASOUND EA 15: CPT

## 2020-10-28 PROCEDURE — 97110 THERAPEUTIC EXERCISES: CPT

## 2020-10-28 PROCEDURE — 97112 NEUROMUSCULAR REEDUCATION: CPT

## 2020-10-28 NOTE — PROGRESS NOTES
Diagnosis: Inversion sprain L ankle    Precautions: Nil of Note  Insurance Type (# Auth): [facility] (8) Total Timed Treatment: 45 min   Total Treatment time: 45 min       Charges: NMRed 1, EX 1, US    Treatment Number: 6/8  Subjective: Pain L ankle is much anca friction massage lateral ligament complex L ankle, ultrasound lateral aspect L ankle and foot, strengthening L foot and ankle, proprioception training L ankle.

## 2020-12-15 ENCOUNTER — OFFICE VISIT (OUTPATIENT)
Dept: INTERNAL MEDICINE CLINIC | Facility: CLINIC | Age: 50
End: 2020-12-15
Payer: COMMERCIAL

## 2020-12-15 ENCOUNTER — LAB ENCOUNTER (OUTPATIENT)
Dept: LAB | Age: 50
End: 2020-12-15
Attending: PHYSICIAN ASSISTANT
Payer: COMMERCIAL

## 2020-12-15 VITALS
OXYGEN SATURATION: 99 % | TEMPERATURE: 98 F | WEIGHT: 268.19 LBS | HEART RATE: 88 BPM | SYSTOLIC BLOOD PRESSURE: 120 MMHG | HEIGHT: 65.5 IN | RESPIRATION RATE: 16 BRPM | DIASTOLIC BLOOD PRESSURE: 78 MMHG | BODY MASS INDEX: 44.15 KG/M2

## 2020-12-15 DIAGNOSIS — E04.1 THYROID NODULE: ICD-10-CM

## 2020-12-15 DIAGNOSIS — E05.90 SUBCLINICAL HYPERTHYROIDISM: ICD-10-CM

## 2020-12-15 DIAGNOSIS — Z12.11 COLON CANCER SCREENING: ICD-10-CM

## 2020-12-15 DIAGNOSIS — I10 ESSENTIAL HYPERTENSION: ICD-10-CM

## 2020-12-15 DIAGNOSIS — E87.6 HYPOKALEMIA: ICD-10-CM

## 2020-12-15 DIAGNOSIS — Z00.00 ANNUAL PHYSICAL EXAM: Primary | ICD-10-CM

## 2020-12-15 DIAGNOSIS — Z12.4 CERVICAL CANCER SCREENING: ICD-10-CM

## 2020-12-15 DIAGNOSIS — R73.03 PREDIABETES: ICD-10-CM

## 2020-12-15 DIAGNOSIS — Z00.00 ANNUAL PHYSICAL EXAM: ICD-10-CM

## 2020-12-15 PROCEDURE — 84481 FREE ASSAY (FT-3): CPT

## 2020-12-15 PROCEDURE — 84439 ASSAY OF FREE THYROXINE: CPT

## 2020-12-15 PROCEDURE — 99396 PREV VISIT EST AGE 40-64: CPT | Performed by: PHYSICIAN ASSISTANT

## 2020-12-15 PROCEDURE — 36415 COLL VENOUS BLD VENIPUNCTURE: CPT

## 2020-12-15 PROCEDURE — 88175 CYTOPATH C/V AUTO FLUID REDO: CPT | Performed by: PHYSICIAN ASSISTANT

## 2020-12-15 PROCEDURE — 83036 HEMOGLOBIN GLYCOSYLATED A1C: CPT

## 2020-12-15 PROCEDURE — 3078F DIAST BP <80 MM HG: CPT | Performed by: PHYSICIAN ASSISTANT

## 2020-12-15 PROCEDURE — 80061 LIPID PANEL: CPT

## 2020-12-15 PROCEDURE — 84450 TRANSFERASE (AST) (SGOT): CPT

## 2020-12-15 PROCEDURE — 84460 ALANINE AMINO (ALT) (SGPT): CPT

## 2020-12-15 PROCEDURE — 86803 HEPATITIS C AB TEST: CPT

## 2020-12-15 PROCEDURE — 87624 HPV HI-RISK TYP POOLED RSLT: CPT | Performed by: PHYSICIAN ASSISTANT

## 2020-12-15 PROCEDURE — 99214 OFFICE O/P EST MOD 30 MIN: CPT | Performed by: PHYSICIAN ASSISTANT

## 2020-12-15 PROCEDURE — 84443 ASSAY THYROID STIM HORMONE: CPT

## 2020-12-15 PROCEDURE — 80048 BASIC METABOLIC PNL TOTAL CA: CPT

## 2020-12-15 PROCEDURE — 3074F SYST BP LT 130 MM HG: CPT | Performed by: PHYSICIAN ASSISTANT

## 2020-12-15 PROCEDURE — 3008F BODY MASS INDEX DOCD: CPT | Performed by: PHYSICIAN ASSISTANT

## 2020-12-15 RX ORDER — LOSARTAN POTASSIUM AND HYDROCHLOROTHIAZIDE 25; 100 MG/1; MG/1
1 TABLET ORAL DAILY
Qty: 90 TABLET | Refills: 1 | Status: SHIPPED | OUTPATIENT
Start: 2020-12-15 | End: 2021-06-09

## 2020-12-15 RX ORDER — POTASSIUM CHLORIDE 750 MG/1
10 TABLET, EXTENDED RELEASE ORAL DAILY
Qty: 90 TABLET | Refills: 1 | Status: SHIPPED | OUTPATIENT
Start: 2020-12-15

## 2020-12-15 NOTE — PROGRESS NOTES
Nela Esquivel is a 53year old female who presents for a complete physical exam.   HPI:   Pt presents for annual wellness screening and f/u of HTN. LMP 10/2019. HTN - compliant with medication, no SEs. Takes K supp \"only when having leg cramps\". sweats, sig change in weight  SKIN: denies any unusual skin lesions or rashes  EYES: denies changes in vision  HEENT: denies nasal congestion, drainage, sore throat  LUNGS: denies shortness of breath, KWOK  CARDIOVASCULAR: denies chest pain, SOB, KWOK  GI: d vision exam, and dental check ups q 6 months. # HTN: controlled, CPM.  Will cont lisinopril/hctz. # Hypokalemia: has been consistently slightly low likely 2/2 HCTZ. Reminded to take K supp EVERY day. # Pre-DM: check a1c now.   # Palpitations: tend to co

## 2020-12-15 NOTE — PATIENT INSTRUCTIONS
Fasting blood work today. Please focus on a diet consisting of lean or plant based proteins, fruits, veggies, and whole grains, as well as regular exercise (30 minutes daily).     High blood pressure:  - continue losartan/HCTZ  - take potassium supplemen

## 2020-12-24 RX ORDER — METRONIDAZOLE 7.5 MG/G
GEL VAGINAL
Qty: 1 TUBE | Refills: 0 | Status: SHIPPED | OUTPATIENT
Start: 2020-12-24

## 2021-02-07 ENCOUNTER — HOSPITAL ENCOUNTER (OUTPATIENT)
Dept: ULTRASOUND IMAGING | Age: 51
Discharge: HOME | End: 2021-02-07
Attending: PHYSICIAN ASSISTANT
Payer: COMMERCIAL

## 2021-02-07 DIAGNOSIS — E04.1 THYROID NODULE: ICD-10-CM

## 2021-02-07 PROCEDURE — 76536 US EXAM OF HEAD AND NECK: CPT | Performed by: PHYSICIAN ASSISTANT

## 2021-06-03 ENCOUNTER — TELEPHONE (OUTPATIENT)
Dept: INTERNAL MEDICINE CLINIC | Facility: CLINIC | Age: 51
End: 2021-06-03

## 2021-06-03 NOTE — TELEPHONE ENCOUNTER
Pt wanted to let nicolas to reach OV notes from Dr Storm Mejia. Pt states that dr Bill Bustamante recommended metoprolol ER 25mg but wanted to check with nicolas.

## 2021-06-06 DIAGNOSIS — I10 ESSENTIAL HYPERTENSION: ICD-10-CM

## 2021-06-06 RX ORDER — LOSARTAN POTASSIUM AND HYDROCHLOROTHIAZIDE 25; 100 MG/1; MG/1
1 TABLET ORAL DAILY
Qty: 90 TABLET | Refills: 1 | Status: CANCELLED | OUTPATIENT
Start: 2021-06-06

## 2021-06-07 RX ORDER — METOPROLOL SUCCINATE 25 MG/1
25 TABLET, EXTENDED RELEASE ORAL DAILY
Qty: 30 TABLET | Refills: 0 | Status: SHIPPED | OUTPATIENT
Start: 2021-06-07

## 2021-06-07 NOTE — TELEPHONE ENCOUNTER
Protocol passed  Medication(s) to Refill:   Requested Prescriptions     Pending Prescriptions Disp Refills   • LOSARTAN POTASSIUM-HCTZ 100-25 MG Oral Tab [Pharmacy Med Name: LOSARTAN/HCTZ 100/25MG TABLETS] 90 tablet 1     Sig: TAKE 1 TABLET BY MOUTH DAILY

## 2021-06-08 DIAGNOSIS — I10 ESSENTIAL HYPERTENSION: ICD-10-CM

## 2021-06-09 RX ORDER — LOSARTAN POTASSIUM AND HYDROCHLOROTHIAZIDE 25; 100 MG/1; MG/1
1 TABLET ORAL DAILY
Qty: 90 TABLET | Refills: 1 | OUTPATIENT
Start: 2021-06-09

## 2021-06-09 RX ORDER — LOSARTAN POTASSIUM AND HYDROCHLOROTHIAZIDE 25; 100 MG/1; MG/1
1 TABLET ORAL DAILY
Qty: 90 TABLET | Refills: 0 | Status: SHIPPED | OUTPATIENT
Start: 2021-06-09 | End: 2021-12-06

## 2021-11-03 ENCOUNTER — HOSPITAL ENCOUNTER (OUTPATIENT)
Dept: MAMMOGRAPHY | Age: 51
Discharge: HOME | End: 2021-11-03
Attending: INTERNAL MEDICINE
Payer: COMMERCIAL

## 2021-11-03 DIAGNOSIS — Z12.31 ENCOUNTER FOR SCREENING MAMMOGRAM FOR MALIGNANT NEOPLASM OF BREAST: ICD-10-CM

## 2021-11-03 PROCEDURE — 77063 BREAST TOMOSYNTHESIS BI: CPT | Performed by: INTERNAL MEDICINE

## 2021-11-03 PROCEDURE — 77067 SCR MAMMO BI INCL CAD: CPT | Performed by: INTERNAL MEDICINE

## 2021-12-06 DIAGNOSIS — I10 ESSENTIAL HYPERTENSION: ICD-10-CM

## 2021-12-06 RX ORDER — LOSARTAN POTASSIUM AND HYDROCHLOROTHIAZIDE 25; 100 MG/1; MG/1
1 TABLET ORAL DAILY
Qty: 90 TABLET | Refills: 0 | Status: SHIPPED | OUTPATIENT
Start: 2021-12-06

## 2022-04-19 ENCOUNTER — OFFICE VISIT (OUTPATIENT)
Dept: INTERNAL MEDICINE CLINIC | Facility: CLINIC | Age: 52
End: 2022-04-19
Payer: COMMERCIAL

## 2022-04-19 VITALS
RESPIRATION RATE: 16 BRPM | OXYGEN SATURATION: 98 % | WEIGHT: 280.63 LBS | BODY MASS INDEX: 46.19 KG/M2 | DIASTOLIC BLOOD PRESSURE: 77 MMHG | HEART RATE: 82 BPM | HEIGHT: 65.5 IN | TEMPERATURE: 99 F | SYSTOLIC BLOOD PRESSURE: 122 MMHG

## 2022-04-19 DIAGNOSIS — E87.6 HYPOKALEMIA: ICD-10-CM

## 2022-04-19 DIAGNOSIS — I10 ESSENTIAL HYPERTENSION: ICD-10-CM

## 2022-04-19 DIAGNOSIS — Z12.11 COLON CANCER SCREENING: ICD-10-CM

## 2022-04-19 DIAGNOSIS — E05.90 SUBCLINICAL HYPERTHYROIDISM: ICD-10-CM

## 2022-04-19 DIAGNOSIS — I10 PRIMARY HYPERTENSION: ICD-10-CM

## 2022-04-19 DIAGNOSIS — Z00.00 ANNUAL PHYSICAL EXAM: Primary | ICD-10-CM

## 2022-04-19 DIAGNOSIS — E66.01 MORBID OBESITY WITH BMI OF 45.0-49.9, ADULT (HCC): ICD-10-CM

## 2022-04-19 DIAGNOSIS — Z12.31 VISIT FOR SCREENING MAMMOGRAM: ICD-10-CM

## 2022-04-19 PROCEDURE — 3008F BODY MASS INDEX DOCD: CPT | Performed by: PHYSICIAN ASSISTANT

## 2022-04-19 PROCEDURE — 90750 HZV VACC RECOMBINANT IM: CPT | Performed by: PHYSICIAN ASSISTANT

## 2022-04-19 PROCEDURE — 3074F SYST BP LT 130 MM HG: CPT | Performed by: PHYSICIAN ASSISTANT

## 2022-04-19 PROCEDURE — 3078F DIAST BP <80 MM HG: CPT | Performed by: PHYSICIAN ASSISTANT

## 2022-04-19 PROCEDURE — 99396 PREV VISIT EST AGE 40-64: CPT | Performed by: PHYSICIAN ASSISTANT

## 2022-04-19 PROCEDURE — 99214 OFFICE O/P EST MOD 30 MIN: CPT | Performed by: PHYSICIAN ASSISTANT

## 2022-04-19 PROCEDURE — 90471 IMMUNIZATION ADMIN: CPT | Performed by: PHYSICIAN ASSISTANT

## 2022-04-19 RX ORDER — LOSARTAN POTASSIUM AND HYDROCHLOROTHIAZIDE 25; 100 MG/1; MG/1
1 TABLET ORAL DAILY
Qty: 90 TABLET | Refills: 1 | Status: SHIPPED | OUTPATIENT
Start: 2022-04-19

## 2022-04-19 RX ORDER — POTASSIUM CHLORIDE 750 MG/1
10 TABLET, EXTENDED RELEASE ORAL DAILY
Qty: 90 TABLET | Refills: 1 | Status: SHIPPED | OUTPATIENT
Start: 2022-04-19

## 2022-04-19 NOTE — PATIENT INSTRUCTIONS
Fasting blood work at [facility].    Follow up with weight loss clinic after labs complete. Please use [employer] or call Hernan Gilliam at [phone] to set up the following tests:  - mammogram - do this in November    Nurse visit for 2nd shingles vaccine in 2 months. Follow up visit with Freddy Bloom in 6 months.

## 2022-04-21 LAB
ALT: 14 U/L (ref 6–29)
AST: 12 U/L (ref 10–35)
BUN: 12 MG/DL (ref 7–25)
CALCIUM: 9.7 MG/DL (ref 8.6–10.4)
CARBON DIOXIDE: 27 MMOL/L (ref 20–32)
CHLORIDE: 105 MMOL/L (ref 98–110)
CHOL/HDLC RATIO: 3.2 (CALC)
CHOLESTEROL, TOTAL: 202 MG/DL
CREATININE: 0.75 MG/DL (ref 0.5–1.05)
EGFR IF AFRICN AM: 108 ML/MIN/1.73M2
EGFR IF NONAFRICN AM: 93 ML/MIN/1.73M2
GLUCOSE: 103 MG/DL (ref 65–99)
HDL CHOLESTEROL: 63 MG/DL
HEMOGLOBIN A1C: 5.8 % OF TOTAL HGB
LDL-CHOLESTEROL: 121 MG/DL (CALC)
NON-HDL CHOLESTEROL: 139 MG/DL (CALC)
POTASSIUM: 4 MMOL/L (ref 3.5–5.3)
SODIUM: 138 MMOL/L (ref 135–146)
T4, FREE: 1.3 NG/DL (ref 0.8–1.8)
TRIGLYCERIDES: 82 MG/DL
TSH W/REFLEX TO FT4: 0.38 MIU/L

## 2022-04-30 NOTE — TELEPHONE ENCOUNTER
PREOPERATIVE DIAGNOSIS:  Chronic wound seroma.    POSTOPERATIVE DIAGNOSIS:  Chronic wound seroma.    OPERATION PERFORMED:  Wound exploration and excision of thickened adipose tissue.    ASSISTANT:  Catalina Keating M.D.    ESTIMATED BLOOD LOSS:  Per anesthesia records.    URINE OUTPUT:  Per anesthesia records.    IV FLUIDS:  Per anesthesia records.    DESCRIPTION OF PROCEDURE:  The patient was brought to the operating room.  She was placed under general endotracheal anesthesia.  She was placed in the dorsal supine position.  She was prepped and draped in the usual fashion.  The patient did have a chronic wound that was not healing, and making out a moderate amount of serous fluid just right of her midline.  I am asked for further recommendations by the wound clinic.   The decision was made to further explore this area.       An incision was made initially just around the 3-4 cm opening; however, we were able to see that there was a great amount of induration of that fluid, and possible necrosis, though I could not see with certainty that did extend pretty much across the 24 cm segment of the midportion of the incision just above the mons pubis. This was further explored, and all that indurated tissue was removed with unipolar cautery.  Good hemostasis was obtained.  Cultures were obtained prior to much of the dissection, and sent off for aerobic, anaerobic, and Gram stain.  Once we were felt that most of the indurated tissue was most likely responsible for the chronic seroma, it was excised.  The decision was made to pack the wound.  The patient was going to be instructed to leave the wound packing in until tomorrow, when she sees the [facility], where she     will be evaluated for possible wound VAC therapy.  The patient tolerated the procedure well, and was transferred to recovery in stable condition.        LÓPEZ   DD: 10/05/2017 1107   DT: 10/05/2017 1154  Job # [id number]      Failed protocol     Last refill:  10/23/2019 Losartan HCTZ #90 1R    LOV:   10/30/2019 Dr Isaac Cedeño RTC 6-12 months  6. Essential hypertension  Uses metoprolol more for palpitations than blood pressure. At normal blood pressure.    - Metoprolol Succinate ER

## 2022-11-26 ENCOUNTER — HOSPITAL ENCOUNTER (OUTPATIENT)
Dept: MAMMOGRAPHY | Age: 52
Discharge: HOME | End: 2022-11-26
Attending: PHYSICIAN ASSISTANT
Payer: COMMERCIAL

## 2022-11-26 DIAGNOSIS — Z12.31 VISIT FOR SCREENING MAMMOGRAM: ICD-10-CM

## 2022-11-26 PROCEDURE — 77067 SCR MAMMO BI INCL CAD: CPT | Performed by: PHYSICIAN ASSISTANT

## 2022-11-26 PROCEDURE — 77063 BREAST TOMOSYNTHESIS BI: CPT | Performed by: PHYSICIAN ASSISTANT

## 2022-12-01 ENCOUNTER — HOSPITAL ENCOUNTER (OUTPATIENT)
Dept: MAMMOGRAPHY | Age: 52
Discharge: HOME | End: 2022-12-01
Attending: PHYSICIAN ASSISTANT
Payer: COMMERCIAL

## 2022-12-01 ENCOUNTER — HOSPITAL ENCOUNTER (OUTPATIENT)
Dept: ULTRASOUND IMAGING | Age: 52
Discharge: HOME | End: 2022-12-01
Attending: PHYSICIAN ASSISTANT
Payer: COMMERCIAL

## 2022-12-01 ENCOUNTER — TELEPHONE (OUTPATIENT)
Dept: GENERAL RADIOLOGY | Facility: HOSPITAL | Age: 52
End: 2022-12-01

## 2022-12-01 DIAGNOSIS — R92.2 INCONCLUSIVE MAMMOGRAM: ICD-10-CM

## 2022-12-01 PROCEDURE — 77061 BREAST TOMOSYNTHESIS UNI: CPT | Performed by: PHYSICIAN ASSISTANT

## 2022-12-01 PROCEDURE — 76642 ULTRASOUND BREAST LIMITED: CPT | Performed by: PHYSICIAN ASSISTANT

## 2022-12-01 PROCEDURE — 77065 DX MAMMO INCL CAD UNI: CPT | Performed by: PHYSICIAN ASSISTANT

## 2022-12-02 ENCOUNTER — PATIENT MESSAGE (OUTPATIENT)
Dept: INTERNAL MEDICINE CLINIC | Facility: CLINIC | Age: 52
End: 2022-12-02

## 2022-12-02 DIAGNOSIS — N63.0 BREAST NODULE: Primary | ICD-10-CM

## 2022-12-02 NOTE — TELEPHONE ENCOUNTER
From: Jennifer Seo  To: MATILDE Marshall  Sent: 12/2/2022 10:22 AM CST  Subject: Order for left breast biopsy. Please submit. Please see notes from ultrasound 12.1.  Order needed

## 2022-12-08 ENCOUNTER — HOSPITAL ENCOUNTER (OUTPATIENT)
Dept: MAMMOGRAPHY | Age: 52
Discharge: HOME | End: 2022-12-08
Attending: PHYSICIAN ASSISTANT
Payer: COMMERCIAL

## 2022-12-08 ENCOUNTER — HOSPITAL ENCOUNTER (OUTPATIENT)
Dept: ULTRASOUND IMAGING | Age: 52
Discharge: HOME | End: 2022-12-08
Attending: PHYSICIAN ASSISTANT
Payer: COMMERCIAL

## 2022-12-08 DIAGNOSIS — N63.0 BREAST MASS: ICD-10-CM

## 2022-12-08 PROCEDURE — 19083 BX BREAST 1ST LESION US IMAG: CPT | Performed by: PHYSICIAN ASSISTANT

## 2022-12-08 PROCEDURE — 77065 DX MAMMO INCL CAD UNI: CPT | Performed by: PHYSICIAN ASSISTANT

## 2022-12-08 PROCEDURE — 88342 IMHCHEM/IMCYTCHM 1ST ANTB: CPT | Performed by: PHYSICIAN ASSISTANT

## 2022-12-08 PROCEDURE — 88305 TISSUE EXAM BY PATHOLOGIST: CPT | Performed by: PHYSICIAN ASSISTANT

## 2022-12-12 NOTE — IMAGING NOTE
Spoke with Prieto Durham post ultrasound guided left breast biopsy. Introduced myself as breast care coordinator. Name and date of birth verified by patient. Reinforced post biopsy care and instruction. Ms. Onel Velasquez denies any issues with biopsy site- bleeding, drainage, redness, tenderness. Pathology results and recommendations shared as follows:   Final Diagnosis:   Ultrasound-guided needle core biopsy, left breast, 10:00, mass:  -Breast tissue with focal fat necrosis associated with acute and chronic inflammation and fibrosis. (See comment.)     Electronically signed by Saba Rangel MD on 12/12/2022 at [address] [place] [address] verbalized understanding and agreement to the above. Ms. Onel Velasquez instructed to perform breast self-exams and notify physician of any changes/concerns. Prieto Durham verbalized agreemrnt.

## 2022-12-13 ENCOUNTER — TELEPHONE (OUTPATIENT)
Dept: GENERAL RADIOLOGY | Facility: HOSPITAL | Age: 52
End: 2022-12-13

## 2023-01-18 ENCOUNTER — OFFICE VISIT (OUTPATIENT)
Dept: INTERNAL MEDICINE CLINIC | Facility: CLINIC | Age: 53
End: 2023-01-18
Payer: COMMERCIAL

## 2023-01-18 VITALS
HEART RATE: 87 BPM | RESPIRATION RATE: 16 BRPM | DIASTOLIC BLOOD PRESSURE: 78 MMHG | HEIGHT: 65.5 IN | TEMPERATURE: 98 F | BODY MASS INDEX: 43.95 KG/M2 | WEIGHT: 267 LBS | OXYGEN SATURATION: 97 % | SYSTOLIC BLOOD PRESSURE: 128 MMHG

## 2023-01-18 DIAGNOSIS — I10 PRIMARY HYPERTENSION: Primary | ICD-10-CM

## 2023-01-18 DIAGNOSIS — E05.90 SUBCLINICAL HYPERTHYROIDISM: ICD-10-CM

## 2023-01-18 DIAGNOSIS — E66.01 MORBID OBESITY WITH BMI OF 40.0-44.9, ADULT (HCC): ICD-10-CM

## 2023-01-18 PROCEDURE — 90471 IMMUNIZATION ADMIN: CPT | Performed by: PHYSICIAN ASSISTANT

## 2023-01-18 PROCEDURE — 3074F SYST BP LT 130 MM HG: CPT | Performed by: PHYSICIAN ASSISTANT

## 2023-01-18 PROCEDURE — 99214 OFFICE O/P EST MOD 30 MIN: CPT | Performed by: PHYSICIAN ASSISTANT

## 2023-01-18 PROCEDURE — 3078F DIAST BP <80 MM HG: CPT | Performed by: PHYSICIAN ASSISTANT

## 2023-01-18 PROCEDURE — 90472 IMMUNIZATION ADMIN EACH ADD: CPT | Performed by: PHYSICIAN ASSISTANT

## 2023-01-18 PROCEDURE — 3008F BODY MASS INDEX DOCD: CPT | Performed by: PHYSICIAN ASSISTANT

## 2023-01-18 PROCEDURE — 90750 HZV VACC RECOMBINANT IM: CPT | Performed by: PHYSICIAN ASSISTANT

## 2023-01-18 PROCEDURE — 90686 IIV4 VACC NO PRSV 0.5 ML IM: CPT | Performed by: PHYSICIAN ASSISTANT

## 2023-01-18 RX ORDER — POTASSIUM CHLORIDE 750 MG/1
10 TABLET, FILM COATED, EXTENDED RELEASE ORAL DAILY
COMMUNITY
Start: 2022-12-27

## 2023-01-18 NOTE — PATIENT INSTRUCTIONS
Continue blood pressure pill and potassium supplement. Call to schedule colonoscopy. See Art Smith in May for your wellness visit.

## 2023-01-22 LAB
T4, FREE: 1.2 NG/DL (ref 0.8–1.8)
TSH W/REFLEX TO FT4: 0.2 MIU/L

## 2023-08-14 NOTE — TELEPHONE ENCOUNTER
90-Day Prescription Request for Potassium CL ER 10 meq Tablet    LOV: 1/18/2023 with Miles Taylor PA-C  RTC: May 2023  Last Relevant Labs: 1/21/2023  Filled: 7/31/2023   #30 with 0 refills    No future appointments.

## 2023-08-15 RX ORDER — POTASSIUM CHLORIDE 750 MG/1
10 TABLET, FILM COATED, EXTENDED RELEASE ORAL DAILY
Qty: 90 TABLET | Refills: 0 | OUTPATIENT
Start: 2023-08-15

## 2023-08-16 ENCOUNTER — OFFICE VISIT (OUTPATIENT)
Dept: INTERNAL MEDICINE CLINIC | Facility: CLINIC | Age: 53
End: 2023-08-16
Payer: COMMERCIAL

## 2023-08-16 VITALS
RESPIRATION RATE: 16 BRPM | TEMPERATURE: 98 F | WEIGHT: 267.63 LBS | SYSTOLIC BLOOD PRESSURE: 114 MMHG | HEART RATE: 93 BPM | OXYGEN SATURATION: 99 % | DIASTOLIC BLOOD PRESSURE: 68 MMHG | HEIGHT: 65.5 IN | BODY MASS INDEX: 44.05 KG/M2

## 2023-08-16 DIAGNOSIS — R73.03 PREDIABETES: ICD-10-CM

## 2023-08-16 DIAGNOSIS — E05.90 SUBCLINICAL HYPERTHYROIDISM: ICD-10-CM

## 2023-08-16 DIAGNOSIS — L91.8 SKIN TAG: ICD-10-CM

## 2023-08-16 DIAGNOSIS — Z12.31 VISIT FOR SCREENING MAMMOGRAM: ICD-10-CM

## 2023-08-16 DIAGNOSIS — I10 ESSENTIAL HYPERTENSION: ICD-10-CM

## 2023-08-16 DIAGNOSIS — Z12.11 COLON CANCER SCREENING: ICD-10-CM

## 2023-08-16 DIAGNOSIS — E78.5 HYPERLIPIDEMIA, UNSPECIFIED HYPERLIPIDEMIA TYPE: ICD-10-CM

## 2023-08-16 DIAGNOSIS — Z00.00 ANNUAL PHYSICAL EXAM: Primary | ICD-10-CM

## 2023-08-16 PROCEDURE — 99396 PREV VISIT EST AGE 40-64: CPT | Performed by: PHYSICIAN ASSISTANT

## 2023-08-16 PROCEDURE — 3078F DIAST BP <80 MM HG: CPT | Performed by: PHYSICIAN ASSISTANT

## 2023-08-16 PROCEDURE — 3074F SYST BP LT 130 MM HG: CPT | Performed by: PHYSICIAN ASSISTANT

## 2023-08-16 PROCEDURE — 99214 OFFICE O/P EST MOD 30 MIN: CPT | Performed by: PHYSICIAN ASSISTANT

## 2023-08-16 PROCEDURE — 3008F BODY MASS INDEX DOCD: CPT | Performed by: PHYSICIAN ASSISTANT

## 2023-08-16 RX ORDER — POTASSIUM CHLORIDE 750 MG/1
10 TABLET, FILM COATED, EXTENDED RELEASE ORAL DAILY
Qty: 90 TABLET | Refills: 3 | Status: SHIPPED | OUTPATIENT
Start: 2023-08-16

## 2023-08-16 RX ORDER — LOSARTAN POTASSIUM AND HYDROCHLOROTHIAZIDE 25; 100 MG/1; MG/1
1 TABLET ORAL DAILY
Qty: 90 TABLET | Refills: 3 | Status: SHIPPED | OUTPATIENT
Start: 2023-08-16

## 2023-08-16 RX ORDER — MECOBALAMIN 5000 MCG
15 TABLET,DISINTEGRATING ORAL DAILY
COMMUNITY

## 2023-08-16 NOTE — PATIENT INSTRUCTIONS
Blood work ASAP at [facility].    Please use [facility] or call Hernan Gilliam at [phone] to set up the following tests:  - mammogram -- due in December    Schedule colonoscopy. See Dr. Wilma Shetty or one of his partners for skin tag.

## 2023-09-13 LAB
ALT: 15 U/L (ref 6–29)
AST: 12 U/L (ref 10–35)
BUN: 11 MG/DL (ref 7–25)
CALCIUM: 9.2 MG/DL (ref 8.6–10.4)
CARBON DIOXIDE: 26 MMOL/L (ref 20–32)
CHLORIDE: 107 MMOL/L (ref 98–110)
CHOL/HDLC RATIO: 3.1 (CALC)
CHOLESTEROL, TOTAL: 191 MG/DL
CREATININE: 0.73 MG/DL (ref 0.5–1.03)
EGFR: 100 ML/MIN/1.73M2
GLUCOSE: 105 MG/DL (ref 65–99)
HDL CHOLESTEROL: 61 MG/DL
HEMOGLOBIN A1C: 5.8 % OF TOTAL HGB
LDL-CHOLESTEROL: 109 MG/DL (CALC)
NON-HDL CHOLESTEROL: 130 MG/DL (CALC)
POTASSIUM: 3.7 MMOL/L (ref 3.5–5.3)
SODIUM: 140 MMOL/L (ref 135–146)
TRIGLYCERIDES: 105 MG/DL
TSH W/REFLEX TO FT4: 0.46 MIU/L

## 2023-09-15 ENCOUNTER — PATIENT MESSAGE (OUTPATIENT)
Dept: INTERNAL MEDICINE CLINIC | Facility: CLINIC | Age: 53
End: 2023-09-15

## 2023-11-24 DIAGNOSIS — I10 ESSENTIAL HYPERTENSION: ICD-10-CM

## 2023-11-24 RX ORDER — LOSARTAN POTASSIUM AND HYDROCHLOROTHIAZIDE 25; 100 MG/1; MG/1
1 TABLET ORAL DAILY
Qty: 90 TABLET | Refills: 3 | OUTPATIENT
Start: 2023-11-24

## 2023-11-24 NOTE — TELEPHONE ENCOUNTER
losartan-hydroCHLOROthiazide 100-25 MG Oral Tab         Sig: Take 1 tablet by mouth daily.     Disp: 90 tablet    Refills: 3    Start: 11/24/2023    Class: Normal    For: Essential hypertension    Last ordered: 3 months ago (8/16/2023) by MATILDE Oliver    Hypertension Medications Protocol [id number]/24/2023 10:39 AM   Protocol Details CMP or BMP in past 12 months    Last serum creatinine< 2.0    Appointment in past 6 or next 3 months      LOV 8/16/23  RTC 1 year  Filled 8/16/23 90 tabs 3 refills   Future Appointments   Date Time Provider Maurisio Mcqueen   1/19/2024  9:15 AM WILLA, PROCEDURE SGIEDW None

## 2024-01-14 LAB
ALT: 16 U/L (ref 6–29)
AST: 14 U/L (ref 10–35)
BUN: 10 MG/DL (ref 7–25)
CALCIUM: 9.2 MG/DL (ref 8.6–10.4)
CARBON DIOXIDE: 27 MMOL/L (ref 20–32)
CHLORIDE: 106 MMOL/L (ref 98–110)
CREATININE: 0.69 MG/DL (ref 0.5–1.03)
EGFR: 104 ML/MIN/1.73M2
GLUCOSE: 93 MG/DL (ref 65–99)
HEMOGLOBIN A1C: 6 % OF TOTAL HGB
POTASSIUM: 3.6 MMOL/L (ref 3.5–5.3)
SODIUM: 140 MMOL/L (ref 135–146)

## 2024-01-19 ENCOUNTER — ANESTHESIA (OUTPATIENT)
Dept: ENDOSCOPY | Facility: HOSPITAL | Age: 54
End: 2024-01-19
Payer: COMMERCIAL

## 2024-01-19 ENCOUNTER — HOSPITAL ENCOUNTER (OUTPATIENT)
Facility: HOSPITAL | Age: 54
Setting detail: HOSPITAL OUTPATIENT SURGERY
Discharge: HOME | End: 2024-01-19
Attending: INTERNAL MEDICINE | Admitting: INTERNAL MEDICINE
Payer: COMMERCIAL

## 2024-01-19 ENCOUNTER — ANESTHESIA EVENT (OUTPATIENT)
Dept: ENDOSCOPY | Facility: HOSPITAL | Age: 54
End: 2024-01-19
Payer: COMMERCIAL

## 2024-01-19 VITALS
DIASTOLIC BLOOD PRESSURE: 75 MMHG | BODY MASS INDEX: 43.95 KG/M2 | WEIGHT: 267 LBS | RESPIRATION RATE: 16 BRPM | SYSTOLIC BLOOD PRESSURE: 119 MMHG | OXYGEN SATURATION: 100 % | HEIGHT: 65.5 IN | HEART RATE: 108 BPM

## 2024-01-19 DIAGNOSIS — Z01.818 PRE-OP TESTING: Primary | ICD-10-CM

## 2024-01-19 DIAGNOSIS — Z12.11 COLON CANCER SCREENING: ICD-10-CM

## 2024-01-19 PROCEDURE — 88305 TISSUE EXAM BY PATHOLOGIST: CPT | Performed by: INTERNAL MEDICINE

## 2024-01-19 PROCEDURE — 0DBL8ZZ EXCISION OF TRANSVERSE COLON, VIA NATURAL OR ARTIFICIAL OPENING ENDOSCOPIC: ICD-10-PCS | Performed by: INTERNAL MEDICINE

## 2024-01-19 RX ORDER — SODIUM CHLORIDE, SODIUM LACTATE, POTASSIUM CHLORIDE, CALCIUM CHLORIDE 600; 310; 30; 20 MG/100ML; MG/100ML; MG/100ML; MG/100ML
INJECTION, SOLUTION INTRAVENOUS CONTINUOUS
Status: DISCONTINUED | OUTPATIENT
Start: 2024-01-19 | End: 2024-01-19

## 2024-01-19 RX ORDER — LIDOCAINE HYDROCHLORIDE 10 MG/ML
INJECTION, SOLUTION EPIDURAL; INFILTRATION; INTRACAUDAL; PERINEURAL AS NEEDED
Status: DISCONTINUED | OUTPATIENT
Start: 2024-01-19 | End: 2024-01-19 | Stop reason: SURG

## 2024-01-19 RX ORDER — NALOXONE HYDROCHLORIDE 0.4 MG/ML
80 INJECTION, SOLUTION INTRAMUSCULAR; INTRAVENOUS; SUBCUTANEOUS AS NEEDED
Status: DISCONTINUED | OUTPATIENT
Start: 2024-01-19 | End: 2024-01-19

## 2024-01-19 RX ADMIN — LIDOCAINE HYDROCHLORIDE 50 MG: 10 INJECTION, SOLUTION EPIDURAL; INFILTRATION; INTRACAUDAL; PERINEURAL at 09:45:00

## 2024-01-19 RX ADMIN — SODIUM CHLORIDE, SODIUM LACTATE, POTASSIUM CHLORIDE, CALCIUM CHLORIDE: 600; 310; 30; 20 INJECTION, SOLUTION INTRAVENOUS at 10:02:00

## 2024-01-19 NOTE — ANESTHESIA POSTPROCEDURE EVALUATION
[facility]    Teresa William Patient Status:  Hospital Outpatient Surgery   Age/Gender 53 year old female MRN [id number]   Location [facility] ENDOSCOPY [facility]IN [facility]ENTER Attending Gerald Juarez DO   Hosp Day # 0 PCP Nafisa Ramirez MD       Anesthesia Post-op Note    COLONOSCOPY WITH COLD SNARE POLYPECTOMY    Procedure Summary       Date: 01/19/24 Room / Location:  ENDOSCOPY 04 /  ENDOSCOPY    Anesthesia Start: 0941 Anesthesia Stop:     Procedure: COLONOSCOPY WITH COLD SNARE POLYPECTOMY Diagnosis:       Colon cancer screening      (COLON POLYPS, HEMORRHOIDS)    Surgeons: Gerald Juarez DO Anesthesiologist: Neo Russo MD    Anesthesia Type: MAC ASA Status: 3            Anesthesia Type: MAC    Vitals Value Taken Time   /75 01/19/24 1003   Temp  01/19/24 1003   Pulse 107 01/19/24 1003   Resp 16 01/19/24 1003   SpO2 100 01/19/24 1003       Patient Location: Endoscopy    Anesthesia Type: MAC    Airway Patency: patent    Postop Pain Control: adequate    Mental Status: mildly sedated but able to meaningfully participate in the post-anesthesia evaluation    Nausea/Vomiting: none    Cardiopulmonary/Hydration status: stable euvolemic    Complications: no apparent anesthesia related complications    Postop vital signs: stable    Dental Exam: Unchanged from Preop    Patient to be discharged from PACU when criteria met.

## 2024-01-19 NOTE — OPERATIVE REPORT
Teresa William Patient Status:  Hospital Outpatient Surgery    1970 MRN [id number]   [facility] ENDOSCOPY PAIN CENTER Attending Gerald Juarez DO   Hosp Day # 0 PCP Nafisa Ramirez MD       PREOPERATIVE DIAGNOSIS/INDICATION: Colorectal Cancer Screen  POSTOPERTATIVE DIAGNOSIS: See Impression  PROCEDURE PERFORMED: COLONOSCOPY with SNARE  DATE: 24  SEDATION: MAC sedation provided by General Anesthesia    TIME OUT WAS PERFORMED    INFORMED CONSENT: I have discussed the risks, benefits, and alternatives to colonoscopy with the patient including but not limited to the risks of bleeding, infection, pain, as well as the risks of anesthesia and perforation all possibly leading to prolonged hospitalization, surgical intervention. I explained that 5-10 % of polyps may be missed even in the best of all circumstances. All questions were answered to the patient’s satisfaction. The patient elected to proceed with colonoscopy with intervention as indicated.  PROCEDURE DESCRIPTION: After careful digital rectal examination a  colonoscope was introduced into the patients rectum, advanced beyond the recto sigmoid junction, into the descending colon, splenic flexure, transverse colon, hepatic flexure, ascending colon, cecum and the last 5-10 cm of the terminal ileum, confirmed by landmarks, including the appendiceal orifice and ileocecal valve. Careful examination of the above described areas was performed on withdrawal of the endoscope. Retroflexion was performed in the rectum. The patient tolerated the procedure well.  There were no immediate complications immediately following the procedure.  The patient was transferred to recovery in stable condition.  QUALITY OF PREPARATION: [place] Bowel Preparation Scale:    Right colon 3, Transverse colon 3, Left colon 3   FINDINGS:  Terminal Ileum: Normal mucosa  Cecum: The mucosa and vascular pattern were normal.   Ascending colon: The mucosa and vascular  pattern were normal.  Transverse colon: 18 mm flat polyp s/p cold snare polypectomy.   Descending colon: The mucosa and vascular pattern were normal.  Sigmoid colon: The mucosa and vascular pattern were normal.  Rectum: The mucosa and vascular pattern were normal. Retroflexion revealed small internal hemorrhoids.     IMPRESSION:   1. Colon Polyp.   2. Internal Hemorrhoids.     RECOMMENDATIONS:   1. Await pathology results.    2. Repeat colonoscopy in 3 years.       [facility]  Gastroenterology/Advanced Endoscopy  [facility]

## 2024-01-19 NOTE — H&P
History & Physical Examination    Patient Name: Teresa William  MRN: [id number]  CSN: [id number]  YOB: 1970    Diagnosis: colorectal cancer screen    Present Illness: 54 y/o F history as above presents for Colonoscopy.     Medications Prior to Admission   Medication Sig Dispense Refill Last Dose    losartan-hydroCHLOROthiazide 100-25 MG Oral Tab Take 1 tablet by mouth daily. 90 tablet 0 2024    PEG 3350-KCl-Na Bicarb-NaCl 420 g Oral Recon Soln Take as directed by physician 4000 mL 0 2024    Potassium Chloride ER 10 MEQ Oral Tab CR Take 1 tablet (10 mEq total) by mouth daily. 90 tablet 3 Past Week     Current Facility-Administered Medications   Medication Dose Route Frequency    lactated ringers infusion   Intravenous Continuous       Allergies: No Known Allergies    Past Medical History:   Diagnosis Date    Allergic rhinitis     Esophageal reflux     High blood pressure     Hyperthyroidism     Obesity, unspecified     Unspecified essential hypertension     Visual impairment      Past Surgical History:   Procedure Laterality Date       &     OTHER SURGICAL HISTORY  2012    CARPAL TUNNEL    OTHER SURGICAL HISTORY      LAP BAND - Dr. Nikolay Davis, Mona Ramos    OTHER SURGICAL HISTORY  2012    UTERINE ABLATION    TUBAL LIGATION  2009     Family History   Problem Relation Age of Onset    Hypertension Mother     Lipids Mother     Other (Other) Maternal Grandmother         dementia    Other (Other) Maternal Grandfather         dementia    No Known Problems Sister     No Known Problems Sister     Cancer Neg     Heart Disease Neg     Stroke Neg      Social History     Tobacco Use    Smoking status: Never    Smokeless tobacco: Never   Substance Use Topics    Alcohol use: Yes     Alcohol/week: 1.0 - 2.0 standard drink of alcohol     Types: 1 - 2 Standard drinks or equivalent per week       SYSTEM Check if Review is Normal Check if Physical Exam is Normal If not normal, please  explain:   HEENT [ x] [x ]    NECK & BACK [ x] [ x]    HEART [ x] [x ]    LUNGS [ x] [ x]    ABDOMEN [ x] [x ]    UROGENITAL [ n/a] [ n/a]    EXTREMITIES [ x] [x ]    OTHER        [ x ] I have discussed the risks and benefits and alternatives with the patient/family.  They understand and agree to proceed with plan of care.  [ x ] I have reviewed the History and Physical done within the last 30 days.  Any changes noted above.    Gerald Juarez DO  1/19/2024  9:37 AM

## 2024-01-19 NOTE — OPERATIVE REPORT
Teresa William Patient Status:  Hospital Outpatient Surgery    1970 MRN [id number]   [facility] ENDOSCOPY PAIN CENTER Attending Gerald Juarez DO   Hosp Day # 0 PCP Nafisa Ramirez MD       PREOPERATIVE DIAGNOSIS/INDICATION: Colorectal Cancer Screen  POSTOPERTATIVE DIAGNOSIS: See Impression  PROCEDURE PERFORMED: COLONOSCOPY with SNARE  DATE: 24  SEDATION: MAC sedation provided by General Anesthesia    TIME OUT WAS PERFORMED    INFORMED CONSENT: I have discussed the risks, benefits, and alternatives to colonoscopy with the patient including but not limited to the risks of bleeding, infection, pain, as well as the risks of anesthesia and perforation all possibly leading to prolonged hospitalization, surgical intervention. I explained that 5-10 % of polyps may be missed even in the best of all circumstances. All questions were answered to the patient’s satisfaction. The patient elected to proceed with colonoscopy with intervention as indicated.  PROCEDURE DESCRIPTION: After careful digital rectal examination a  colonoscope was introduced into the patients rectum, advanced beyond the recto sigmoid junction, into the descending colon, splenic flexure, transverse colon, hepatic flexure, ascending colon, cecum and the last 5-10 cm of the terminal ileum, confirmed by landmarks, including the appendiceal orifice and ileocecal valve. Careful examination of the above described areas was performed on withdrawal of the endoscope. Retroflexion in the rectum was deferred due to small rectal vault. The patient tolerated the procedure well.  There were no immediate complications immediately following the procedure.  The patient was transferred to recovery in stable condition.  QUALITY OF PREPARATION: [place] Bowel Preparation Scale:    Right colon 3, Transverse colon 3, Left colon 3   FINDINGS:  Terminal Ileum: Normal mucosa  Cecum: Four sessile polyps ranging 2 mm to 4 mm s/p cold snare  polypectomy.   Ascending colon: 3 mm sessile polyp s/p cold snare polypectomy.   Transverse colon: 12 mm sessile polyp s/p cold snare polypectomy.   Descending colon: Diverticulosis.   Sigmoid colon: The mucosa and vascular pattern were normal.  Rectum: The mucosa and vascular pattern were normal. Internal Hemorrhoids.     IMPRESSION:   1. Colon Polyps.    2. Diverticulosis.    3. Internal Hemorrhoids.     RECOMMENDATIONS:   1. Await pathology results.    2. Repeat colonoscopy in 3 years.       MATEO Juarez  Gastroenterology/Advanced Endoscopy  [facility]

## 2024-01-19 NOTE — H&P
History & Physical Examination    Patient Name: Teresa William  MRN: [id number]  CSN: [id number]  YOB: 1970    Diagnosis: colorectal cancer screen    Present Illness: 54 y/o F history as above presents for Colonoscopy.     Medications Prior to Admission   Medication Sig Dispense Refill Last Dose    losartan-hydroCHLOROthiazide 100-25 MG Oral Tab Take 1 tablet by mouth daily. 90 tablet 0     Potassium Chloride ER 10 MEQ Oral Tab CR Take 1 tablet (10 mEq total) by mouth daily. 90 tablet 3     PEG 3350-KCl-Na Bicarb-NaCl 420 g Oral Recon Soln Take as directed by physician 4000 mL 0      Current Facility-Administered Medications   Medication Dose Route Frequency    lactated ringers infusion   Intravenous Continuous       Allergies: No Known Allergies    Past Medical History:   Diagnosis Date    Allergic rhinitis     Esophageal reflux     High blood pressure     Hyperthyroidism     Obesity, unspecified     Unspecified essential hypertension     Visual impairment      Past Surgical History:   Procedure Laterality Date       &     OTHER SURGICAL HISTORY      CARPAL TUNNEL    OTHER SURGICAL HISTORY      LAP BAND - Dr. Nikolay Davis, Mona Ramos    OTHER SURGICAL HISTORY      UTERINE ABLATION    TUBAL LIGATION       Family History   Problem Relation Age of Onset    Hypertension Mother     Lipids Mother     Other (Other) Maternal Grandmother         dementia    Other (Other) Maternal Grandfather         dementia    No Known Problems Sister     No Known Problems Sister     Cancer Neg     Heart Disease Neg     Stroke Neg      Social History     Tobacco Use    Smoking status: Never    Smokeless tobacco: Never   Substance Use Topics    Alcohol use: Yes     Alcohol/week: 1.0 - 2.0 standard drink of alcohol     Types: 1 - 2 Standard drinks or equivalent per week       SYSTEM Check if Review is Normal Check if Physical Exam is Normal If not normal, please explain:   HEENT [ x] [x ]     NECK & BACK [ x] [ x]    HEART [ x] [x ]    LUNGS [ x] [ x]    ABDOMEN [ x] [x ]    UROGENITAL [ n/a] [ n/a]    EXTREMITIES [ x] [x ]    OTHER        [ x ] I have discussed the risks and benefits and alternatives with the patient/family.  They understand and agree to proceed with plan of care.  [ x ] I have reviewed the History and Physical done within the last 30 days.  Any changes noted above.    Gerald Juarez DO  1/19/2024  8:43 AM

## 2024-01-19 NOTE — ANESTHESIA PREPROCEDURE EVALUATION
PRE-OP EVALUATION    Patient Name: Teresa William    Admit Diagnosis: Colon cancer screening [Z12.11]    Pre-op Diagnosis: Colon cancer screening [Z12.11]    COLONOSCOPY    Anesthesia Procedure: COLONOSCOPY    Surgeon(s) and Role:     * Gerald Juarez,  - Gino    Pre-op vitals reviewed.        Body mass index is 43.76 kg/m².    Current medications reviewed.  Hospital Medications:  No current facility-administered medications on file as of 2024.       Outpatient Medications:     Medications Prior to Admission   Medication Sig Dispense Refill Last Dose    losartan-hydroCHLOROthiazide 100-25 MG Oral Tab Take 1 tablet by mouth daily. 90 tablet 0     Potassium Chloride ER 10 MEQ Oral Tab CR Take 1 tablet (10 mEq total) by mouth daily. 90 tablet 3     PEG 3350-KCl-Na Bicarb-NaCl 420 g Oral Recon Soln Take as directed by physician 4000 mL 0        Allergies: Patient has no known allergies.      Anesthesia Evaluation    Patient summary reviewed.    Anesthetic Complications  (-) history of anesthetic complications         GI/Hepatic/Renal      (+) GERD                           Cardiovascular                (+) obesity  (+) hypertension                                     Endo/Other             (+) hyperthyroidism                     Pulmonary                           Neuro/Psych                                      Past Surgical History:   Procedure Laterality Date       &     OTHER SURGICAL HISTORY  2012    CARPAL TUNNEL    OTHER SURGICAL HISTORY      LAP BAND - Dr. Nikolay Davis, Mona Ramos    OTHER SURGICAL HISTORY  2012    UTERINE ABLATION    TUBAL LIGATION  2009     Social History     Socioeconomic History    Marital status:    Tobacco Use    Smoking status: Never    Smokeless tobacco: Never   Vaping Use    Vaping Use: Never used   Substance and Sexual Activity    Alcohol use: Yes     Alcohol/week: 1.0 - 2.0 standard drink of alcohol     Types: 1 - 2 Standard drinks or  equivalent per week    Drug use: No    Sexual activity: Yes     Partners: Male   Other Topics Concern    Caffeine Concern Yes     Comment: 1 cup of coffee per day    Exercise No     History   Drug Use No     Available pre-op labs reviewed.     Lab Results   Component Value Date     01/13/2024    K 3.6 01/13/2024     01/13/2024    CO2 27 01/13/2024    BUN 10 01/13/2024    CREATSERUM 0.69 01/13/2024    GLU 93 01/13/2024    CA 9.2 01/13/2024            Airway      Mallampati: II  Mouth opening: 3 FB  TM distance: 4 - 6 cm  Neck ROM: full Cardiovascular      Rhythm: regular  Rate: normal     Dental  Comment: No loose teeth reported by patient           Pulmonary      Breath sounds clear to auscultation bilaterally.               Other findings              ASA: 3   Plan: MAC  NPO status verified and patient meets guidelines.          Plan/risks discussed with: patient                Present on Admission:  **None**

## 2024-01-19 NOTE — DISCHARGE INSTRUCTIONS
Home Care Instructions for Colonoscopy with Sedation    Diet:  - Resume your regular diet as tolerated unless otherwise instructed.  - Start with light meals to minimize bloating.  - Do not drink alcohol today.    Medication:  - If you have questions about resuming your normal medications, please contact your Primary Care Physician.    Activities:  - Take it easy today. Do not return to work today.  - Do not drive today.  - Do not operate any machinery today (including kitchen equipment).    Colonoscopy:  - You may notice some rectal \"spotting\" (a little blood on the toilet tissue) for a day or two after the exam. This is normal.  - If you experience any rectal bleeding (not spotting), persistent tenderness or sharp severe abdominal pains, oral temperature over 100 degrees Fahrenheit, light-headedness or dizziness, or any other problems, contact your doctor.      **If unable to reach your doctor, please go to the [facility] Emergency Room**    - Your referring physician will receive a full report of your examination.  - If you do not hear from your doctor's office within two weeks of your biopsy, please call them for your results.    You may be able to see your laboratory results in [employer] between 4 and 7 business days.  In some cases, your physician may not have viewed the results before they are released to [employer].  If you have questions regarding your results contact the physician who ordered the test/exam by phone or via [employer] by choosing \"Ask a Medical Question.\"

## 2024-01-27 ENCOUNTER — HOSPITAL ENCOUNTER (OUTPATIENT)
Dept: MAMMOGRAPHY | Age: 54
Discharge: HOME | End: 2024-01-27
Attending: PHYSICIAN ASSISTANT
Payer: COMMERCIAL

## 2024-01-27 DIAGNOSIS — Z12.31 VISIT FOR SCREENING MAMMOGRAM: ICD-10-CM

## 2024-01-27 PROCEDURE — 77063 BREAST TOMOSYNTHESIS BI: CPT | Performed by: PHYSICIAN ASSISTANT

## 2024-01-27 PROCEDURE — 77067 SCR MAMMO BI INCL CAD: CPT | Performed by: PHYSICIAN ASSISTANT

## 2024-03-06 DIAGNOSIS — I10 ESSENTIAL HYPERTENSION: ICD-10-CM

## 2024-03-06 RX ORDER — LOSARTAN POTASSIUM AND HYDROCHLOROTHIAZIDE 25; 100 MG/1; MG/1
1 TABLET ORAL DAILY
Qty: 90 TABLET | Refills: 0 | Status: SHIPPED | OUTPATIENT
Start: 2024-03-06

## 2024-03-06 NOTE — TELEPHONE ENCOUNTER
Requested Renewals     Name from pharmacy: LOSARTAN-HCTZ 100-25 MG TAB         Will file in chart as: LOSARTAN-HYDROCHLOROTHIAZIDE 100-25 MG Oral Tab    Sig: TAKE 1 TABLET BY MOUTH EVERY DAY    Disp: 90 tablet    Refills: 0 (Pharmacy requested: Not specified)    Start: 3/6/2024    Class: Normal    Non-formulary For: Essential hypertension    Last ordered: 3 months ago (12/4/2023) by Nafisa Ramirez MD    Last refill: 12/4/2023    Rx #: [id number]    Hypertension Medications Protocol [id number]/06/2024 10:17 AM   Protocol Details CMP or BMP in past 12 months    Last BP reading less than 140/90    In person appointment or virtual visit in the past 12 mos or appointment in next 3 mos    EGFRCR or GFRAA > 50      To be filled at: [facility]/pharmacy #[id number] - [place], [place] - [address] [place] [address] AT [facility], [phone], [phone]        LOV: 08/16/2023-  RTC: 1 year  RECENT LABS: 01/13/2024  FOV: none

## 2024-03-12 RX ORDER — POTASSIUM CHLORIDE 750 MG/1
10 TABLET, FILM COATED, EXTENDED RELEASE ORAL DAILY
Qty: 90 TABLET | Refills: 0 | Status: SHIPPED | OUTPATIENT
Start: 2024-03-12

## 2024-05-13 DIAGNOSIS — I10 ESSENTIAL HYPERTENSION: ICD-10-CM

## 2024-05-13 RX ORDER — LOSARTAN POTASSIUM AND HYDROCHLOROTHIAZIDE 25; 100 MG/1; MG/1
1 TABLET ORAL DAILY
Qty: 90 TABLET | Refills: 0 | Status: SHIPPED | OUTPATIENT
Start: 2024-05-13

## 2024-05-13 NOTE — TELEPHONE ENCOUNTER
Outgoing call to inform patient that her prescription has been approved to go to her pharmacy. I informed patient that she is due for labs to complete and needs to establish with another Primary care physician due to past provider no longer being with our office    Patient verbalized understanding     Patient requested to establish with Dr. Ramirez as her Primary care physician     Patient is scheduled     Future Appointments   Date Time Provider Department Center   8/23/2024  9:00 AM Nafisa Ramirez MD [facility] [place]

## 2024-06-23 LAB
ALT: 15 U/L (ref 6–29)
AST: 14 U/L (ref 10–35)
BUN: 12 MG/DL (ref 7–25)
CALCIUM: 9.5 MG/DL (ref 8.6–10.4)
CARBON DIOXIDE: 28 MMOL/L (ref 20–32)
CHLORIDE: 103 MMOL/L (ref 98–110)
CHOL/HDLC RATIO: 3 (CALC)
CHOLESTEROL, TOTAL: 208 MG/DL
CREATININE: 0.74 MG/DL (ref 0.5–1.03)
EGFR: 97 ML/MIN/1.73M2
GLUCOSE: 90 MG/DL (ref 65–99)
HDL CHOLESTEROL: 69 MG/DL
HEMOGLOBIN A1C: 5.9 % OF TOTAL HGB
LDL-CHOLESTEROL: 124 MG/DL (CALC)
NON-HDL CHOLESTEROL: 139 MG/DL (CALC)
POTASSIUM: 3.6 MMOL/L (ref 3.5–5.3)
SODIUM: 139 MMOL/L (ref 135–146)
TRIGLYCERIDES: 54 MG/DL

## 2024-08-19 ENCOUNTER — PATIENT MESSAGE (OUTPATIENT)
Dept: INTERNAL MEDICINE CLINIC | Facility: CLINIC | Age: 54
End: 2024-08-19

## 2024-08-19 DIAGNOSIS — E05.90 SUBCLINICAL HYPERTHYROIDISM: Primary | ICD-10-CM

## 2024-08-20 DIAGNOSIS — E05.90 SUBCLINICAL HYPERTHYROIDISM: Primary | ICD-10-CM

## 2024-08-21 LAB
T4, FREE: 1.3 NG/DL (ref 0.8–1.8)
TSH W/REFLEX TO FT4: 0.33 MIU/L

## 2024-08-23 ENCOUNTER — OFFICE VISIT (OUTPATIENT)
Dept: INTERNAL MEDICINE CLINIC | Facility: CLINIC | Age: 54
End: 2024-08-23
Payer: COMMERCIAL

## 2024-08-23 VITALS
OXYGEN SATURATION: 98 % | SYSTOLIC BLOOD PRESSURE: 122 MMHG | WEIGHT: 256.38 LBS | HEART RATE: 93 BPM | BODY MASS INDEX: 42.2 KG/M2 | TEMPERATURE: 97 F | DIASTOLIC BLOOD PRESSURE: 74 MMHG | HEIGHT: 65.5 IN

## 2024-08-23 DIAGNOSIS — E66.01 CLASS 3 SEVERE OBESITY DUE TO EXCESS CALORIES WITH SERIOUS COMORBIDITY AND BODY MASS INDEX (BMI) OF 40.0 TO 44.9 IN ADULT (HCC): ICD-10-CM

## 2024-08-23 DIAGNOSIS — E04.1 THYROID NODULE: ICD-10-CM

## 2024-08-23 DIAGNOSIS — Z12.31 VISIT FOR SCREENING MAMMOGRAM: ICD-10-CM

## 2024-08-23 DIAGNOSIS — I10 PRIMARY HYPERTENSION: ICD-10-CM

## 2024-08-23 DIAGNOSIS — Z00.00 ROUTINE GENERAL MEDICAL EXAMINATION AT A HEALTH CARE FACILITY: Primary | ICD-10-CM

## 2024-08-23 DIAGNOSIS — E05.90 SUBCLINICAL HYPERTHYROIDISM: ICD-10-CM

## 2024-08-23 DIAGNOSIS — I10 ESSENTIAL HYPERTENSION: ICD-10-CM

## 2024-08-23 DIAGNOSIS — R73.03 PREDIABETES: ICD-10-CM

## 2024-08-23 PROCEDURE — 90471 IMMUNIZATION ADMIN: CPT | Performed by: INTERNAL MEDICINE

## 2024-08-23 PROCEDURE — 99214 OFFICE O/P EST MOD 30 MIN: CPT | Performed by: INTERNAL MEDICINE

## 2024-08-23 PROCEDURE — 99396 PREV VISIT EST AGE 40-64: CPT | Performed by: INTERNAL MEDICINE

## 2024-08-23 PROCEDURE — 90715 TDAP VACCINE 7 YRS/> IM: CPT | Performed by: INTERNAL MEDICINE

## 2024-08-23 RX ORDER — POTASSIUM CHLORIDE 750 MG/1
10 TABLET, EXTENDED RELEASE ORAL DAILY
Qty: 90 TABLET | Refills: 3 | Status: SHIPPED | OUTPATIENT
Start: 2024-08-23

## 2024-08-23 RX ORDER — TIRZEPATIDE 7.5 MG/.5ML
7.5 INJECTION, SOLUTION SUBCUTANEOUS WEEKLY
COMMUNITY

## 2024-08-23 RX ORDER — LOSARTAN POTASSIUM AND HYDROCHLOROTHIAZIDE 25; 100 MG/1; MG/1
1 TABLET ORAL DAILY
Qty: 90 TABLET | Refills: 3 | Status: SHIPPED | OUTPATIENT
Start: 2024-08-23

## 2024-08-23 NOTE — PATIENT INSTRUCTIONS
I recommend the following vaccines -  Annual FLU every September, October.    Stay up to date with COVID vaccines.     Please resume over the counter vitamin D3 2000 units daily.    Please complete fasting labs in January, 2025.

## 2024-08-31 LAB
ALT: 18 U/L (ref 6–29)
AST: 15 U/L (ref 10–35)
BUN: 10 MG/DL (ref 7–25)
CALCIUM: 9.5 MG/DL (ref 8.6–10.4)
CARBON DIOXIDE: 24 MMOL/L (ref 20–32)
CHLORIDE: 105 MMOL/L (ref 98–110)
CREATININE: 0.64 MG/DL (ref 0.5–1.03)
EGFR: 106 ML/MIN/1.73M2
GLUCOSE: 88 MG/DL (ref 65–99)
HEMOGLOBIN A1C: 5.8 % OF TOTAL HGB
POTASSIUM: 3.7 MMOL/L (ref 3.5–5.3)
SODIUM: 140 MMOL/L (ref 135–146)
T3, FREE: 3.2 PG/ML (ref 2.3–4.2)
T3, TOTAL: 97 NG/DL (ref 76–181)
T4 (THYROXINE), TOTAL: 6.5 MCG/DL (ref 5.1–11.9)

## 2024-12-12 NOTE — TELEPHONE ENCOUNTER
Assessment/Plan   Tootie Bear is a 50 y.o. female for HTN, chronic headache anxiety and depression fatty liver and alcohol abuse being evaluated today for chronic diarrhea and to review results of blood work, stool studies, and liver ultrasound.       Chronic diarrhea  -Celiac serology panel normal.   -Sed rate and CRP normal  -Fecal calprotectin and ova and parasite negative.  -C. difficile was canceled by lab  -Recommend colonoscopy with random biopsies  -GoLytely prep  -Labs reviewed  -Patient informed she needs a  the day of the procedure  -Patient may take Imodium 1-2 tablets at the onset of diarrhea and repeat after each additional loose stool up to a maximum dose of 8 tablets/day  -Recommend fiber supplement such as Metamucil or Benefiber daily    2.   Persistent elevation in liver enzymes 2/2 alcohol abuse in the setting of hepatic steatosis  -Excessive alcohol intake x20 years  -Elevated iron iron 248, iron saturation 66%, ferritin 133.  -Hemochromatosis HFE gene mutation negative  -No family history of chronic liver disease  -Abdominal ultrasound shows hepatic steatosis, otherwise normal appearance of the gallbladder, bile ducts, pancreas, spleen and hepatic vasculature.  -RANDY and AMA normal. SMA 1:80.  -Ceruloplasmin, acute toxicology and acute hepatitis panel normal/negtive  -Patient instructed to abstain from alcohol  -Recommend patient find a support group for alcoholics, participate in alcohol recovery program, participate in AA meetings.  -Discuss with Dr. Jnoes regarding anxiety/depression and alcohol abuse  -Check FibroScan to evaluate amount of steatosis and evaluate for fibrosis    -Follow-up with GI 2 weeks after colonoscopy to review results.      TOMER Bass-CNP         Subjective     History of Present Illness:   Tootie Bear is a 50 y.o. female with medical history significant for HTN, chronic headache, anxiety, depression and alcoholism being evaluated today through  From: Teresa William  To: Nafisa Ramirez  Sent: 8/19/2024 9:59 AM CDT  Subject: Thyroid    Usually I get my thyroid tested annual. Can you send an order so i can have my results friday.   virtual visit for follow-up chronic diarrhea and to review results of blood work, stool studies, and liver ultrasound.  Patient was last seen in the office last month with complaints of abdominal pain and diarrhea.  Patient reports having 6-8 loose bowel movements sometimes watery bowel movements that are brown in color.  She denies black or bloody stools.  She reports bowel urgency and near fecal incontinence often.  Patient states she is unable to take Pepto-Bismol as it makes her vomit.  She has tried Imodium in the past but was only taking 1 to 2 tablets daily with little effect on symptoms.  Patient states she had a colonoscopy in 2013 which was normal.  Unable to find results in EMR.  No family history of CRC.  Patient states she has lost weight over the last few years unintentionally. Patient did submit stool samples as requested.  Stool pathogen PCR negative, ova and parasite negative, and fecal calprotectin 9.  C. difficile order was canceled by lab    Patient was noted to have mild elevation AST and total bilirubin.  Patient has a history of alcohol abuse and admits to drinking heavily for the last 20 years.  Patient was drinking liquor daily but over the last few years she switched to 2-3 tall cans of beer daily.  Patient states her last beer was 2 weeks ago and she intends to stay away from alcohol altogether.  She denies right upper quadrant pain, nausea or vomiting.  No yellow of the eye or yellowing of the skin.  Denies dark yellow urine.  No new prescribed medications.  No family history of chronic liver disease.      LOV 11/19/2024  Tootie Bear is a 50 y.o. female for HTN, chronic headache anxiety and depression who presents to GI clinic for abdominal pain and diarrhea.  Patient reports having 6-8 loose to watery brown bowel movements daily associated with abdominal cramping.  Denies black or bloody stools.  Patient also endorses having frequent UTIs and treated with antibiotics often.  She  "denies epigastric pain, nausea, vomiting, constipation, unintentional weight loss.  Patient states she drinks alcohol daily.  She used to drink liquor daily however stopped a few months ago and is currently only drinking \"couple of beers\" every day.  Patient is not exactly quantifying how much she drank or how long she drinks but admits to excessive drinking for 20 years.  Review of blood work shows elevation in total bilirubin and AST over the last 4+ years.  No CLD workup has been initiated.     Review of Systems  ROS Negative unless otherwise stated above.    Past Medical History  HTN, chronic headache, anxiety, depression and alcoholism     Social History   reports that she has been smoking cigarettes. She does not have any smokeless tobacco history on file. She reports current drug use. Drug: Marijuana.   History of heavy drinking x 20 years.  Mostly liquor daily.  Now drinking 2-3 beers tall cans daily.      Family History  No family history of CRC or IBD.  No family history of chronic liver disease.    Allergies  No Known Allergies    Medications  Current Outpatient Medications   Medication Instructions    amLODIPine (NORVASC) 5 mg, Daily    ARIPiprazole (ABILIFY) 10 mg, Nightly    busPIRone (Buspar) 10 mg tablet     cholecalciferol (Vitamin D-3) 50,000 unit capsule     ergocalciferol (Vitamin D-2) 1.25 MG (21370 UT) capsule 1 capsule, Once Weekly    hydrOXYzine pamoate (Vistaril) 50 mg capsule     losartan (Cozaar) 50 mg tablet 1 tablet, Daily (0630)    pantoprazole (ProtoNix) 40 mg EC tablet 1 tablet, Daily (0630)    prazosin (MINIPRESS) 1 mg, Nightly    traZODone (DESYREL) 50 mg    venlafaxine XR (Effexor-XR) 75 mg 24 hr capsule         Objective   Telephone visit follow-up blood work, stool studies, ultrasound    General: A&Ox3, NAD.  Engaged in conversation  Asking and answering questions appropriately  Hearing good, speech good.  Psych: Normal mood and affect.     Lab Results   Component Value Date    " WBC 7.7 10/02/2024    WBC 10.1 12/12/2022    WBC 10.3 06/28/2020    HGB 14.2 10/02/2024    HGB 13.5 12/12/2022    HGB 15.8 06/28/2020    HCT 41.4 10/02/2024    HCT 39.0 12/12/2022    HCT 44.5 06/28/2020     10/02/2024     12/12/2022     (L) 06/28/2020     Lab Results   Component Value Date     10/02/2024    K 4.3 10/02/2024     10/02/2024    CO2 25 10/02/2024    BUN 8 10/02/2024    CREATININE 0.80 10/02/2024    GLUCOSE 72 (L) 10/02/2024    CALCIUM 9.8 10/02/2024       Lab Results   Component Value Date    ALKPHOS 42 10/02/2024    ALKPHOS 46 12/12/2022    ALKPHOS 45 06/28/2020    BILITOT 1.2 10/02/2024    BILITOT 1.2 12/12/2022    BILITOT 1.8 (H) 06/28/2020    BILIDIR 0.3 06/28/2020    PROT 7.3 10/02/2024    PROT 7.4 12/12/2022    PROT 8.2 06/28/2020    ALT 21 10/02/2024    ALT 25 12/12/2022    ALT 14 06/28/2020    AST 43 (H) 10/02/2024    AST 41 (H) 12/12/2022    AST 18 06/28/2020      === 11/29/24 ===    US LIVER WITH DOPPLER    - Impression -  Normal hepatic Doppler evaluation.    Signed by: Sofi Quiros 11/30/2024 12:26 PM  Dictation workstation:   [id number]

## 2025-01-02 ENCOUNTER — TELEPHONE (OUTPATIENT)
Dept: INTERNAL MEDICINE CLINIC | Facility: CLINIC | Age: 55
End: 2025-01-02

## 2025-02-01 ENCOUNTER — HOSPITAL ENCOUNTER (OUTPATIENT)
Dept: MAMMOGRAPHY | Age: 55
Discharge: HOME | End: 2025-02-01
Attending: INTERNAL MEDICINE
Payer: COMMERCIAL

## 2025-02-01 DIAGNOSIS — Z12.31 VISIT FOR SCREENING MAMMOGRAM: ICD-10-CM

## 2025-02-01 PROCEDURE — 77063 BREAST TOMOSYNTHESIS BI: CPT | Performed by: INTERNAL MEDICINE

## 2025-02-01 PROCEDURE — 77067 SCR MAMMO BI INCL CAD: CPT | Performed by: INTERNAL MEDICINE

## 2025-02-03 ENCOUNTER — PATIENT MESSAGE (OUTPATIENT)
Dept: INTERNAL MEDICINE CLINIC | Facility: CLINIC | Age: 55
End: 2025-02-03

## 2025-02-03 DIAGNOSIS — R73.03 PREDIABETES: ICD-10-CM

## 2025-02-03 DIAGNOSIS — E04.1 THYROID NODULE: Primary | ICD-10-CM

## 2025-02-03 DIAGNOSIS — E05.90 SUBCLINICAL HYPERTHYROIDISM: ICD-10-CM

## 2025-02-03 DIAGNOSIS — I10 PRIMARY HYPERTENSION: ICD-10-CM

## 2025-02-04 NOTE — TELEPHONE ENCOUNTER
Last labs completed on 8/31/2024, please advise if any labs needed     LOV   8/23/2024 Dr Ramirez RTC 1 year

## 2025-03-18 DIAGNOSIS — E05.90 SUBCLINICAL HYPERTHYROIDISM: Primary | ICD-10-CM

## 2025-03-18 LAB
ALT: 17 U/L (ref 6–29)
AST: 13 U/L (ref 10–35)
BUN: 8 MG/DL (ref 7–25)
CALCIUM: 9.5 MG/DL (ref 8.6–10.4)
CARBON DIOXIDE: 28 MMOL/L (ref 20–32)
CHLORIDE: 105 MMOL/L (ref 98–110)
CREATININE: 0.68 MG/DL (ref 0.5–1.03)
EGFR: 104 ML/MIN/1.73M2
GLUCOSE: 86 MG/DL (ref 65–99)
HEMOGLOBIN A1C: 5.6 % OF TOTAL HGB
POTASSIUM: 3.8 MMOL/L (ref 3.5–5.3)
SODIUM: 140 MMOL/L (ref 135–146)
T3, FREE: 3.3 PG/ML (ref 2.3–4.2)
T3, TOTAL: 97 NG/DL (ref 76–181)
T4 (THYROXINE), TOTAL: 7.4 MCG/DL (ref 5.1–11.9)
T4, FREE: 1.4 NG/DL (ref 0.8–1.8)
TSH: 0.11 MIU/L

## 2025-05-13 ENCOUNTER — OFFICE VISIT (OUTPATIENT)
Facility: CLINIC | Age: 55
End: 2025-05-13
Payer: COMMERCIAL

## 2025-05-13 VITALS
HEART RATE: 76 BPM | BODY MASS INDEX: 36.71 KG/M2 | WEIGHT: 223 LBS | SYSTOLIC BLOOD PRESSURE: 122 MMHG | DIASTOLIC BLOOD PRESSURE: 78 MMHG | OXYGEN SATURATION: 92 % | HEIGHT: 65.5 IN

## 2025-05-13 DIAGNOSIS — E04.1 THYROID NODULE: ICD-10-CM

## 2025-05-13 DIAGNOSIS — E05.90 SUBCLINICAL HYPERTHYROIDISM: Primary | ICD-10-CM

## 2025-05-13 PROCEDURE — 99204 OFFICE O/P NEW MOD 45 MIN: CPT | Performed by: STUDENT IN AN ORGANIZED HEALTH CARE EDUCATION/TRAINING PROGRAM

## 2025-05-13 NOTE — H&P
[facility] Note    Name: Teresa William    Date: 5/13/2025    Teresa William is a 54 year old female who presents for evaluation of thyroid management.     Chief complaint: Thyroid Problem (Pt here just establish care with endo and see if she needs any further workup for her Subclinical hyperTH)       Subjective:   Initial HPI consult - 5/13/2025   Presents today to discuss thyroid nodule and hyperthyroidism. Notes she was diagnosed with subclinical hyperthyroidism around 2010 after she had her daughter.  Noted symptoms of cold intolerance at that time.  Was found to have T3 thyrotoxicosis on labs.      Per review, was following with Dr. Acosta, LOV 6/2021 01/2015: TSH 0.09, FT4 1.3. 02/15/16: TSH 0.17, FT4 1.0   02/22/16: Left 1.2 cm and isthmus 1.0 cm nodules  02/22/16: 24-hr STEARNS uptake 16.9% with uniform scan. TRAb negative. Ani-thyroid Abs negative  06/16/16: Right isthmus is 0.6 cm hypoechoic. Left inferior pole 1.34 cm, isoechoic. Left negative FNA  06/16/16: TSH 0.32, FT4 1.3. 08/03/18: TSH 0.1, FT4 1.0  10/04/18: Isthmus 0.8 cm hypoechoic nodule. Left inferior pole 1.5 cm isoechoic nodule. Stable.  12/15/20: TSH 0.27, FT4 1.0, FT3 2.26.  02/2021: Neck US: Heterogenous texture with right inferior pole 1.7 cm and left inferior pole 1.9 cm nodules.   Started on BB in 2021 and had some improvement. Decided to discontinue after talking to PCP for 6 months.     Thyroid Hx:   -Medication Hx: denies MMI or PTU use   -Family history- denies thyroid hx     [] History of head/neck radiation   [] Recent illness or contrast exposure  [] Biotin Use  [] Amiodarone Use       Also has a hx of lapband surgery many years ago, currently on GLP1    Saw ophthalmology; due again in 6 months and denies ocular symptoms  Does intermittently note palpitations   Denies other symptoms of hyperthyroidism at this time    History/Other:    Allergies, PMH, SocHx and FHx reviewed and updated as appropriate in Epic on  Current Medications[1]  Allergies[2]  Current Medications[3]  Past Medical History[4]  Family History[5]  Social history: Reviewed.      ROS/Exam    REVIEW OF SYSTEMS: Ten point review of systems has been performed and is otherwise negative and/or non-contributory, except as described above.     VITALS  Vitals:    05/13/25 0901   BP: 122/78   Pulse: 76   SpO2: 92%   Weight: 223 lb (101.2 kg)   Height: 5' 5.5\" (1.664 m)       Wt Readings from Last 6 Encounters:   05/13/25 223 lb (101.2 kg)   08/23/24 256 lb 6.4 oz (116.3 kg)   12/27/23 267 lb (121.1 kg)   08/16/23 267 lb 9.6 oz (121.4 kg)   01/18/23 267 lb (121.1 kg)   04/19/22 280 lb 9.6 oz (127.3 kg)       PHYSICAL EXAM  CONSTITUTIONAL:  awake, alert, cooperative, no apparent distress, and appears stated age    PSYCH: normal affect  LUNGS: breathing comfortably  CARDIOVASCULAR:  regular rate   NECK: Palpable left-sided thyroid nodule    Labs/Imaging: Pertinent imaging reviewed.  Thyroid:    Lab Results   Component Value Date    TSH 0.11 (L) 03/17/2025    TSH 0.271 (L) 12/15/2020    TSH 0.122 (L) 08/03/2018    T4F 1.4 03/17/2025    T4F 1.3 08/20/2024    T4F 1.2 01/21/2023         Assessment & Plan:     ICD-10-CM    1. Subclinical hyperthyroidism  E05.90 TSH and Free T4 [E]     Triiodothyronine (T3) Total [E]     FREE T4 BY DIALYSIS/MASS SPEC [74169] [Q]     VITAMIN D, 25-HYDROXY [60297][Q]     Thyroid Stimulating Immunoglobulin     Thyrotropin Receptor Antibody, Serum     HUMAN ANTI-MOUSE ANTIBODIES [7318] [Q]      2. Thyroid nodule - see subclinincal hyperthyroidism for description  E04.1 TSH and Free T4 [E]     Triiodothyronine (T3) Total [E]     FREE T4 BY DIALYSIS/MASS SPEC [95934] [Q]          Teresa William is a pleasant 54 year old female here for evaluation of:    #Low TSH  Lab Results   Component Value Date    TSH 0.11 (L) 03/17/2025    T4F 1.4 03/17/2025      PMHx of Low TSH diagnosed in 2010.  Previous workup in 2016 with negative antibodies and 24-hour  uptake with a uniform scan within normal range.  Denies previous initiation of thioamide.  Was briefly on metoprolol for 6 months in 2021.       - Pathophysiology of condition, goals of therapy,  d/w pt in detail.   - clinical significance of thyroid and antibody testing discussed   -Patient endorses cold intolerance and intermittent palpitations  -Recent TFTs with TSH progression to 0.11.  Free T4 also slowly increasing to 1.4    - thyroid is nodular on exam, no opthalmopathy on exam   - pt is not pregnant, breastfeeding or planning for pregnancy in the near future      Plan:   - lab orders placed and will discuss next steps once all labs result   - Patient to let us know if she notes symptoms of hyperthyroidism      #Thyroid nodule  Patient has a history of thyroid nodules with ultrasound February 2016 showing a left 1.2 cm and a isthmus 1.0 cm nodule.  Most recent ultrasound completed February 2021 showing 1.9 cm left inferior pole nodule and a right 1.7 cm nodule, which is considered to be a shadow and completed in house with previous endocrinologist.  She has had a biopsy of left nodule in June 2016 with benign pathology     Reviewed FNA criteria, Lyle Staging, and LORENA guidelines for TT/lobectomy depending on FNA results.  Pt will complete thyroid ultrasound and we will discuss next steps at that time  Based on blood work, can also consider nuclear medicine uptake and scan as a last was done in 2016 when TSH was 0.32      Return in about 6 months (around 11/13/2025).    The above plan was discussed in detail with the patient who verbalized understanding and agreement.     Ginger Santana DO  [facility]  5/13/2025     In reviewing this note, please be advised that Dragon Voice Recognition software used to dictate the note may have made errors in recognizing some of the words or phrases.     Note to patient: The 21 Century Cures Act makes medical notes like these available to patients in the interest  of transparency. However, be advised this is a medical document. It is intended as peer to peer communication. It is written in medical language and may contain abbreviations or verbiage that are unfamiliar. It may appear blunt or direct. Medical documents are intended to carry relevant information, facts as evident, and the clinical opinion of the practitioner.            [1]    Tirzepatide-Weight Management (ZEPBOUND) 7.5 MG/0.5ML Subcutaneous Solution Auto-injector Inject 7.5 mg into the skin once a week.      losartan-hydroCHLOROthiazide 100-25 MG Oral Tab Take 1 tablet by mouth daily. 90 tablet 3    Potassium Chloride ER 10 MEQ Oral Tab CR Take 1 tablet (10 mEq total) by mouth daily. 90 tablet 3   [2] No Known Allergies  [3]   Current Outpatient Medications   Medication Sig Dispense Refill    Tirzepatide-Weight Management (ZEPBOUND) 7.5 MG/0.5ML Subcutaneous Solution Auto-injector Inject 7.5 mg into the skin once a week.      losartan-hydroCHLOROthiazide 100-25 MG Oral Tab Take 1 tablet by mouth daily. 90 tablet 3    Potassium Chloride ER 10 MEQ Oral Tab CR Take 1 tablet (10 mEq total) by mouth daily. 90 tablet 3   [4]   Past Medical History:   Allergic rhinitis    Esophageal reflux    High blood pressure    Hyperthyroidism    Obesity, unspecified    Unspecified essential hypertension    Visual impairment   [5]   Family History  Problem Relation Age of Onset    Hypertension Mother     Lipids Mother     Other (Other) Maternal Grandmother         dementia    Other (Other) Maternal Grandfather         dementia    No Known Problems Sister     No Known Problems Sister     Cancer Neg     Heart Disease Neg     Stroke Neg

## 2025-05-13 NOTE — PATIENT INSTRUCTIONS
Visit Summary  Please complete your labs and thyroid ultrasound. Once you complete your ultrasound, please keep me updated and I can let you know your next steps     General follow up information:  Please let us know if you require any refills at least 1 week prior to your medication running out. If you do run out of medication, please call our office ASAP to request refills (do not wait until your follow up).  Please call us if you experience any problems with insurance coverage of medication, lab work, or imaging.   Lab results and imaging will typically be reviewed at follow up appointments, or within 3-5 business days of ALL results being in if you do not have an appointment scheduled in the near future. Our office will contact you for any abnormal results requiring more urgent follow up or action.  The on-call pager is for urgent matters only. If you are a type 1 diabetic and run out of insulin after business hours 8AM-4PM, you may call the on-call pager for a refill to a 24 hour pharmacy. If you have adrenal insufficiency and run out of steroids, you may call the on-call pager for a refill to a 24 hours pharmacy. All other refill requests should be requested during business hours.    Return Visit   [x] Dr. Santana in 6 months   [] Virtual visit  [] No follow up appointment needed  [] Follow up to be scheduled pending      []  Fasting/8AM labs  [x]  Central scheduling # for ultrasound/nuclear med/CT/MRI/DXA/IR  []  Provide flyer for:  [] ENT   [] Weight Management  [] Infertility/Reproductive Endocrinology  [] Transgender care  []  Directions to 1st floor lab  [] Collect urine collection jug  [] Collect salivary cortisol tubes  []  Dental clearance form  []  Will need authorization for outside records

## 2025-05-22 ENCOUNTER — PATIENT MESSAGE (OUTPATIENT)
Facility: CLINIC | Age: 55
End: 2025-05-22

## 2025-05-23 LAB
Lab: 1.8 NG/DL (ref 0.9–2.2)
Lab: <6 NG/ML
T3, TOTAL: 89 NG/DL (ref 76–181)
T4, FREE: 1.4 NG/DL (ref 0.8–1.8)
TSH: 0.21 MIU/L
TSI: <89 % BASELINE
VITAMIN D, 25-OH, TOTAL: 34 NG/ML (ref 30–100)

## 2025-05-28 NOTE — TELEPHONE ENCOUNTER
Thank you, I have reviewed her labs and her recent labs from 5/2025. I had ordered a Trab level- do you know if patient completed this? Thanks!

## 2025-05-29 NOTE — TELEPHONE ENCOUNTER
I don't see a TRAb, it doesn't look like they saranya it though I guess we'd have to confirm with them directly. If they didn't do it, she unfortunately should go have it drawn so Dr. YANG can decide on necessity of uptake/scan based on that.

## 2025-06-08 LAB — TRAB: <1 IU/L

## 2025-06-16 ENCOUNTER — PATIENT MESSAGE (OUTPATIENT)
Facility: CLINIC | Age: 55
End: 2025-06-16

## 2025-06-16 DIAGNOSIS — E05.90 SUBCLINICAL HYPERTHYROIDISM: Primary | ICD-10-CM

## 2025-06-18 NOTE — TELEPHONE ENCOUNTER
Patient telephoned in regarding uptake and scan. Patient would like to get test done through [facility] because it is a lot cheaper . [facility] is cost effective. Asking if it can be ordered through one of those facility's or if she needs to get a paper order.

## 2025-06-18 NOTE — TELEPHONE ENCOUNTER
Telephoned patient to call us back at her earliest convenience. Sent my chart message.     Closing encounter.

## 2025-06-18 NOTE — TELEPHONE ENCOUNTER
Please thank her for her patience- given her antibodies are negative and her TSH remains low, I would recommend an uptake and scan. Order placed.

## (undated) DEVICE — 10FT COMBINED O2 DELIVERY/CO2 MONITORING. FILTER WITH MICROSTREAM TYPE LUER: Brand: DUAL ADULT NASAL CANNULA

## (undated) DEVICE — KIT VLV 5 PC AIR H2O SUCT BX ENDOGATOR CONN

## (undated) DEVICE — KIT CUSTOM ENDOPROCEDURE STERIS

## (undated) DEVICE — 3M™ RED DOT™ MONITORING ELECTRODE WITH FOAM TAPE AND STICKY GEL 2570-3, 3/BAG, 200/CASE, 54/PLT: Brand: RED DOT™

## (undated) DEVICE — 1200CC GUARDIAN II: Brand: GUARDIAN

## (undated) DEVICE — LASSO POLYPECTOMY SNARE: Brand: LASSO

## (undated) DEVICE — TRAP POLYP W/ 2 SPEC TY CLR MAGNIFYING WIND

## (undated) NOTE — LETTER
11/29/19        [place] [id number]      Dear Derick Oglesby,    Our records indicate that you have outstanding lab work and or testing that was ordered for you and has not yet been completed:  Orders Placed This Encounter

## (undated) NOTE — LETTER
01/14/21        [address]aryl Messer  [address] [place] [address]      Dear Annabella Ceron,    Our records indicate that you have outstanding lab work and or testing that was ordered for you and has not yet been completed:  Orders Placed This Encounter

## (undated) NOTE — LETTER
10/27/17        [id number] [id number]      Dear Neena Pemberton,    Our records indicate that you have outstanding lab work and or testing that was ordered for you and has not yet been completed:          Basic Metabolic Panel

## (undated) NOTE — LETTER
09/04/18        [facility]  [facility] [address]      Dear Sakshi Hogan,    Our records indicate that you have outstanding lab work and or testing that was ordered for you and has not yet been completed:          MITZI SCREENING MAICOL SextonC

## (undated) NOTE — LETTER
10/16/20        Will Gamino  [address]  [place] [place] [address]      Dear Justus Garcia,    Our records indicate that you have outstanding lab work and or testing that was ordered for you and has not yet been completed:      MITZI ODEN 2D+3D SCREENING B